# Patient Record
Sex: FEMALE | Race: WHITE | NOT HISPANIC OR LATINO | Employment: UNEMPLOYED | RURAL
[De-identification: names, ages, dates, MRNs, and addresses within clinical notes are randomized per-mention and may not be internally consistent; named-entity substitution may affect disease eponyms.]

---

## 2017-01-03 ENCOUNTER — OFFICE VISIT (OUTPATIENT)
Dept: GASTROENTEROLOGY | Facility: CLINIC | Age: 62
End: 2017-01-03

## 2017-01-03 VITALS
BODY MASS INDEX: 33.31 KG/M2 | DIASTOLIC BLOOD PRESSURE: 70 MMHG | HEART RATE: 95 BPM | WEIGHT: 188 LBS | HEIGHT: 63 IN | SYSTOLIC BLOOD PRESSURE: 142 MMHG

## 2017-01-03 DIAGNOSIS — R11.0 NAUSEA: ICD-10-CM

## 2017-01-03 DIAGNOSIS — R63.4 WEIGHT LOSS, ABNORMAL: ICD-10-CM

## 2017-01-03 DIAGNOSIS — Z87.19 HISTORY OF COLITIS: ICD-10-CM

## 2017-01-03 DIAGNOSIS — R13.10 DYSPHAGIA, UNSPECIFIED TYPE: ICD-10-CM

## 2017-01-03 DIAGNOSIS — K21.00 GASTROESOPHAGEAL REFLUX DISEASE WITH ESOPHAGITIS: Primary | ICD-10-CM

## 2017-01-03 DIAGNOSIS — R19.7 DIARRHEA, UNSPECIFIED TYPE: ICD-10-CM

## 2017-01-03 PROCEDURE — 99243 OFF/OP CNSLTJ NEW/EST LOW 30: CPT | Performed by: PHYSICIAN ASSISTANT

## 2017-01-03 RX ORDER — SODIUM, POTASSIUM,MAG SULFATES 17.5-3.13G
1 SOLUTION, RECONSTITUTED, ORAL ORAL ONCE
Qty: 1 BOTTLE | Refills: 0 | Status: SHIPPED | OUTPATIENT
Start: 2017-01-03 | End: 2017-01-03

## 2017-01-03 NOTE — PROGRESS NOTES
Chief Complaint   Patient presents with   • Difficulty Swallowing       Subjective    Mylene Cortez is a 61 y.o. female. she is being seen for consultation today at the request of Dr. Nunes.    History of Present Illness    This 61-year-old female lists no occupation was sent for consultation for abdominal pain and dysphagia Dr. Nunes who saw the patient on 9/15/16.  Patient states she does get frequent respiratory infections.  She has a long-standing history of polymyositis with dysphagia.  She denies having had any radiographic studies to evaluate this problem.  She mainly has dysphagia to solids, breads, starchy foods.  She has nausea at times but no vomiting.  In the past she's had severe heartburn and currently states her Protonix does fairly well.  She complains of 30 10 mid-upper abdominal pain.  She relates that she has had long-standing urgency to her bowel movements, frequently after she eats within minutes she will need to have a bowel movement.  She generally has 5-6 bowel movements per day.  No blood or mucus in her stools.  She states in the past 2 years because of stress and diet changes she has lost about 50 pounds.  Patient does see Dr. Johnson and is on CellCept.  Most recent available laboratories on 8/16/16 cholesterol 209.  Normal CBC, CMP, A1c, vitamin D was 22.8.    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  She states she's had previous colon polyps.  We were able to obtain EGD/colonoscopy done on 9/26/13 after the patient left showed a Schatzki's ring, hiatal hernia, nonerosive gastritis.  Colonoscopy showed a colitis in the descending colon with biopsy showing nonspecific changes.  Sigmoid diverticulosis, hemorrhoids, hyperplastic polyp removed from the sigmoid, 2 tubular adenomas removed from the transverse  colon.    Patient currently denies tobacco, alcohol, illicit substance use.  She has a history of pyomyositis, hysterectomy, cholecystectomy, knee surgeries, pneumonia.  Family history of colon cancer in an aunt, breast cancer, lung cancer, diabetes, heart disease, stroke, gallstones, hypertension, allergies.  Father  age 58 with pneumonia.  Mother  age 38 and a car accident.  Spouse age 56 in good health  since .  One brother was murdered.  6 sisters in average health, one sisters had lung cancer.  One brother was murdered.  2 children in good health.    A/P: Patient with nausea, increasing dysphagia, suspect peptic stricture, recommend EGD with probable dilatation.  Consider motility evaluation, possible barium study, chest x-ray.  Given her history of colon polyps and questionable history of colitis and diarrhea recommend colonoscopies with attempted look at the terminal ileum, as well as biopsies.  We'll see her in follow-up after the above, further pending clinical course and the results of the above.    Thank you very much Dr. Nunes for this consultation and for allowing us risk been care of your patient.  We'll keep you informed.     The following portions of the patient's history were reviewed and updated as appropriate:   Past Medical History   Diagnosis Date   • Allergic    • Anemia    • Arthritis    • Asthma    • Cholelithiasis    • Colon polyp    • Diverticulosis    • Dysphagia    • Encounter for screening for diabetes mellitus    • Encounter for screening for endocrine disorder      for other suspected endocrine disorder   • Essential hypertension      Primary   • Fatigue    • GERD (gastroesophageal reflux disease)    • H/O colonoscopy    • Hyperlipidemia    • Hypertension    • Low back pain    • Neuromuscular disorder    • Obesity    • Pneumonia    • Polymyositis      myalgia   • Polymyositis with myopathy    • Seizures    • Skin lesion    • Weakness      Past Surgical History    Procedure Laterality Date   • Colonoscopy  2014   • Total abdominal hysterectomy  2004   • Colonoscopy     • Fracture surgery     • Total abdominal hysterectomy with salpingo oophorectomy     • Knee surgery  2007   • Upper gastrointestinal endoscopy  09/26/2013     Schatzki's ring present in the GE junction.A hiatus hernia found in the cardia.Mild non-erosive gastritis found in the antrum.two biopsies taken.Normal duodenum. [facility]   • Gallbladder surgery  2008   • Pap smear  2013   • Hysterectomy       Family History   Problem Relation Age of Onset   • Breast cancer Sister    • Lung cancer Sister    • Alcohol abuse Other    • Diabetes Other    • Heart disease Other    • Hyperlipidemia Other    • Hypertension Other    • Osteoarthritis Other    • Colon cancer Other    • Other Mother 38     mva   • Alcohol abuse Other    • Diabetes Other    • Heart disease Other    • Hyperlipidemia Other    • Hypertension Other    • Osteoarthritis Other    • Colon cancer Other      colorectal cancer     OB History     No data available        Allergies   Allergen Reactions   • Bactrim [Sulfamethoxazole-Trimethoprim]    • Methotrexate Derivatives    • Morphine And Related Hives   • Singulair [Montelukast Sodium]    • Sulfa Antibiotics Irritability   • Tetanus Toxoids Other (See Comments)     *tetanus vaccines  *fainted     Social History     Social History   • Marital status:      Spouse name: N/A   • Number of children: N/A   • Years of education: N/A     Social History Main Topics   • Smoking status: Never Smoker   • Smokeless tobacco: Never Used   • Alcohol use No      Comment: socially   • Drug use: No   • Sexual activity: Not Asked     Other Topics Concern   • None     Social History Narrative    ** Merged History Encounter **            Current Outpatient Prescriptions:   •  acetaminophen-codeine (TYLENOL #3) 300-30 MG per tablet, Take 1 tablet by mouth Every 6 (Six) Hours As Needed for moderate  "pain (4-6)., Disp: 60 tablet, Rfl: 1  •  amLODIPine (NORVASC) 5 MG tablet, Take 5 mg by mouth daily., Disp: , Rfl:   •  cyclobenzaprine (FLEXERIL) 5 MG tablet, Take 1 tablet by mouth 3 (Three) Times a Day As Needed for muscle spasms., Disp: 90 tablet, Rfl: 11  •  Ergocalciferol (VITAMIN D2) 2000 UNITS tablet, Take 1 tablet by mouth daily., Disp: 90 tablet, Rfl: 3  •  esomeprazole (NEXIUM) 40 MG capsule, Take 1 capsule by mouth Every Morning Before Breakfast., Disp: 30 capsule, Rfl: 11  •  fexofenadine (ALLEGRA ALLERGY) 180 MG tablet, Take 1 tablet by mouth Daily., Disp: 30 tablet, Rfl: 11  •  fluticasone (FLONASE) 50 MCG/ACT nasal spray, 2 sprays into each nostril Daily for 30 days., Disp: 1 each, Rfl: 0  •  Melatonin 10 MG capsule, Take 1 capsule by mouth at night as needed., Disp: , Rfl:   •  meloxicam (MOBIC) 15 MG tablet, Take 15 mg by mouth every morning., Disp: , Rfl:   •  mycophenolate (CELLCEPT) 500 MG tablet, Take 1,000 mg by mouth daily., Disp: , Rfl:   •  traZODone (DESYREL) 50 MG tablet, Take 1 tablet by mouth Every Night., Disp: 30 tablet, Rfl: 11  •  Unable to find, 1 each daily. Med Name: * coQ10-ALA-resveratrol-leucovorin 1mg-B6-B12 2pd-V-M6-500 unit tablet, Disp: , Rfl:   Review of Systems  Review of Systems   Constitutional: Positive for unexpected weight change.   HENT: Positive for trouble swallowing. Negative for voice change.    Gastrointestinal: Positive for abdominal pain, diarrhea and nausea. Negative for abdominal distention, anal bleeding, blood in stool, constipation, rectal pain and vomiting.   Genitourinary: Negative for difficulty urinating.   All other systems reviewed and are negative.         Objective      Visit Vitals   • /70 (BP Location: Left arm)   • Pulse 95   • Ht 63\" (160 cm)   • Wt 188 lb (85.3 kg)   • BMI 33.3 kg/m2     Physical Exam   Constitutional: She is oriented to person, place, and time. She appears well-developed and well-nourished. No distress.   Chronically " ill appearing   HENT:   Head: Normocephalic and atraumatic.   Eyes: EOM are normal. Pupils are equal, round, and reactive to light.   Neck: Normal range of motion.   Cardiovascular: Normal rate, regular rhythm and normal heart sounds.    Pulmonary/Chest: Effort normal and breath sounds normal.   Abdominal: Soft. Bowel sounds are normal. She exhibits no shifting dullness, no distension, no abdominal bruit, no ascites and no mass. There is no hepatosplenomegaly. There is tenderness. There is no rigidity, no rebound, no guarding and no CVA tenderness. No hernia. Hernia confirmed negative in the ventral area.   Obese, scar, mild diffuse   Musculoskeletal: Normal range of motion.   Neurological: She is alert and oriented to person, place, and time.   Skin: Skin is warm and dry.   Psychiatric: She has a normal mood and affect. Her behavior is normal. Judgment and thought content normal.   Nursing note and vitals reviewed.    Hospital Outpatient Visit on 08/16/2016   Component Date Value Ref Range Status   • Total Cholesterol 08/16/2016 209* 0 - 199 mg/dl Final    CHOL AVERAGE RISK: 200 - 239 MG/DL   • Triglycerides 08/16/2016 193  20 - 199 mg/dl Final    TRIG DESIRED: < 200 MG/DL   • HDL Cholesterol 08/16/2016 52* 60 - 200 mg/dl Final    HDL AVERAGE RISK: 35 - 60 MG/DL   • LDL Cholesterol  08/16/2016 118  0 - 129 mg/dl Final    Comment: Calculated LDL results only valid if Triglycerides are < 400 mg/dL.  LDL DESIRED: < 130 MG/DL     • Sodium 08/16/2016 143  137 - 145 mmol/L Final   • Potassium 08/16/2016 4.8  3.5 - 5.1 mmol/L Final   • Chloride 08/16/2016 104  95 - 110 mmol/L Final   • CO2 08/16/2016 27  22 - 31 mmol/L Final   • Anion Gap 08/16/2016 12.0  5.0 - 15.0 mmol/L Final   • Glucose 08/16/2016 100  60 - 100 mg/dl Final   • BUN 08/16/2016 9  7 - 21 mg/dl Final   • Creatinine 08/16/2016 0.7  0.5 - 1.0 mg/dl Final   • GFR MDRD Non  08/16/2016 86  51 - 120 mL/min/1.73 sq.M Final    Comment: Invalid if  creatinine is changing or the patient is on dialysis. Use AA  result if patient is -American, non AA result otherwise.     • GFR MDRD  08/16/2016 104  51 - 120 mL/min/1.73 sq.M Final   • Calcium 08/16/2016 9.5  8.4 - 10.2 mg/dl Final   • Total Protein 08/16/2016 6.7  6.3 - 8.6 gm/dl Final   • Albumin 08/16/2016 3.9  3.4 - 4.8 gm/dl Final   • Total Bilirubin 08/16/2016 0.3  0.2 - 1.3 mg/dl Final   • Alkaline Phosphatase 08/16/2016 75  38 - 126 U/L Final   • AST (SGOT) 08/16/2016 28  14 - 36 U/L Final   • ALT (SGPT) 08/16/2016 29  9 - 52 U/L Final   • 25 Hydroxy, Vitamin D 08/16/2016 22.8* 30.0 - 100.0 ng/ml Final    Comment: INTERPRETIVE INFORMATION:  Deficient...................<20 ng/ml  Insufficient..........20-<30 ng/ml  Sufficient.............. ng/ml  Potiential Toxicity.....100 ng/ml       • Hemoglobin A1C 08/16/2016 5.4  4.0 - 5.6 %TotHgb Final   • WBC 08/16/2016 6.1  3.2 - 9.8 x1000/uL Final   • RBC 08/16/2016 4.99  3.77 - 5.16 sung/mm3 Final   • Hemoglobin 08/16/2016 13.8  12.0 - 15.5 gm/dl Final   • Hematocrit 08/16/2016 42.6  35.0 - 45.0 % Final   • MCV 08/16/2016 85.4  80.0 - 98.0 fl Final   • MCH 08/16/2016 27.7  26.0 - 34.0 pg Final   • MCHC 08/16/2016 32.4  31.4 - 36.0 gm/dl Final   • RDW 08/16/2016 13.7  11.5 - 14.5 % Final   • Platelets 08/16/2016 257  150 - 450 x1000/mm3 Final   • MPV 08/16/2016 11.1  8.0 - 12.0 fl Final   • Neutrophil Rel % 08/16/2016 48.8  37.0 - 80.0 % Final   • Lymphocyte Rel % 08/16/2016 44.6  10.0 - 50.0 % Final   • Monocyte Rel % 08/16/2016 4.0  0.0 - 12.0 % Final   • Eosinophil Rel % 08/16/2016 2.1  0.0 - 7.0 % Final   • Basophil Rel % 08/16/2016 0.3  0.0 - 2.0 % Final   • Immature Granulocyte Rel % 08/16/2016 0.20  0.00 - 0.50 % Final   • Neutrophils Absolute 08/16/2016 2.96  2.00 - 8.60 x1000/uL Final   • Lymphocytes Absolute 08/16/2016 2.71  0.60 - 4.20 x1000/uL Final   • Monocytes Absolute 08/16/2016 0.24  0.00 - 0.90 x1000/uL Final   •  Eosinophils Absolute 08/16/2016 0.13  0.00 - 0.70 x1000/uL Final   • Basophils Absolute 08/16/2016 0.02  0.00 - 0.20 x1000/uL Final   • Immature Granulocytes Absolute 08/16/2016 0.010  0.005 - 0.022 x1000/uL Final     Assessment/Plan      1. Gastroesophageal reflux disease with esophagitis    2. Nausea    3. Diarrhea, unspecified type    4. Weight loss, abnormal    5. Dysphagia, unspecified type    6. History of colitis    .   Mylene was seen today for difficulty swallowing.    Diagnoses and all orders for this visit:    Gastroesophageal reflux disease with esophagitis  -     Esophagoscopy w/ dilation    Nausea  -     Esophagoscopy w/ dilation    Diarrhea, unspecified type  -     Esophagoscopy w/ dilation  -     Colonoscopy w/ biopsies  -     sodium-potassium-magnesium sulfates (SUPREP BOWEL PREP) solution oral solution; Take 1 bottle by mouth 1 (One) Time for 1 dose. Take as per instruction sheet for colonoscopy prep.    Weight loss, abnormal  -     Esophagoscopy w/ dilation  -     Colonoscopy w/ biopsies    Dysphagia, unspecified type  -     Esophagoscopy w/ dilation    History of colitis  -     Colonoscopy w/ biopsies  -     sodium-potassium-magnesium sulfates (SUPREP BOWEL PREP) solution oral solution; Take 1 bottle by mouth 1 (One) Time for 1 dose. Take as per instruction sheet for colonoscopy prep.        Orders placed during this encounter include:  Orders Placed This Encounter   Procedures   • Esophagoscopy w/ dilation     Sedation: MAC  Endoscopy Protocol:  *If patient is diabetic, check FSBS.  *For females of child bearing age, 10-55 years old, who have not had a hysterectomy-Urine for HcG. If unable to obtain urine specimen, obtain serum for HcG.  *If applicable, access Mediport and flush per VAD flush protocol.  *For outpatients-D5 1/2 NS @KVO on arrival. If patient is diabetic with FSBS 200 or greater, give NS @KVO.  *For Inpatients that have IVFs with any additive, hang NS @@KVO prior to procedure and  discontinue prior to leaving Endoscopy.  *May use LIdocaine 0.5%, 0.5ml (if not allergic) x three, to infiltrate, 30 gauge needle for initiation of IV.    EGDw/Dil, Colon with bx, Victorina, routine   • Colonoscopy w/ biopsies     Sedation: MAC  Endoscopy Protocol:  *If patient is diabetic, check FSBS.  *For females of child bearing age, 10-55 years old, who have not had a hysterectomy-Urine for HcG. If unable to obtain urine specimen, obtain serum for HcG.  *If applicable, access Mediport and flush per VAD flush protocol.  *For outpatients-D5 1/2 NS @KVO on arrival. If patient is diabetic with FSBS 200 or greater, give NS @KVO.  *For Inpatients that have IVFs with any additive, hang NS @@KVO prior to procedure and discontinue prior to leaving Endoscopy.  *May use LIdocaine 0.5%, 0.5ml (if not allergic) x three, to infiltrate, 30 gauge needle for initiation of IV.    EGDw/Dil, Colon with bx, Victorina, routine       Medications prescribed:  New Medications Ordered This Visit   Medications   • sodium-potassium-magnesium sulfates (SUPREP BOWEL PREP) solution oral solution     Sig: Take 1 bottle by mouth 1 (One) Time for 1 dose. Take as per instruction sheet for colonoscopy prep.     Dispense:  1 bottle     Refill:  0     Discontinued Medications       Reason for Discontinue    montelukast (SINGULAIR) 5 MG chewable tablet Side effects    Diphenhydramine-APAP, sleep, (TYLENOL PM EXTRA STRENGTH PO) Therapy completed        Requested Prescriptions     Signed Prescriptions Disp Refills   • sodium-potassium-magnesium sulfates (SUPREP BOWEL PREP) solution oral solution 1 bottle 0     Sig: Take 1 bottle by mouth 1 (One) Time for 1 dose. Take as per instruction sheet for colonoscopy prep.       Review and/or summary of lab tests, radiology, procedures, medications. Review and summary of old records and obtaining of history. The risks and benefits of my recommendations, as well as other treatment options were discussed with the  patient today. Questions were answered.    Follow-up: Return if symptoms worsen or fail to improve, for After the above.        This document has been electronically signed by Og Haas PA-C on January 11, 2017 6:35 PM      Results for orders placed or performed during the hospital encounter of 08/16/16   Vitamin D 25 hydroxy   Result Value Ref Range    25 Hydroxy, Vitamin D 22.8 (L) 30.0 - 100.0 ng/ml   CBC and Differential   Result Value Ref Range    WBC 6.1 3.2 - 9.8 x1000/uL    RBC 4.99 3.77 - 5.16 sung/mm3    Hemoglobin 13.8 12.0 - 15.5 gm/dl    Hematocrit 42.6 35.0 - 45.0 %    MCV 85.4 80.0 - 98.0 fl    MCH 27.7 26.0 - 34.0 pg    MCHC 32.4 31.4 - 36.0 gm/dl    RDW 13.7 11.5 - 14.5 %    Platelets 257 150 - 450 x1000/mm3    MPV 11.1 8.0 - 12.0 fl    Neutrophil Rel % 48.8 37.0 - 80.0 %    Lymphocyte Rel % 44.6 10.0 - 50.0 %    Monocyte Rel % 4.0 0.0 - 12.0 %    Eosinophil Rel % 2.1 0.0 - 7.0 %    Basophil Rel % 0.3 0.0 - 2.0 %    Immature Granulocyte Rel % 0.20 0.00 - 0.50 %    Neutrophils Absolute 2.96 2.00 - 8.60 x1000/uL    Lymphocytes Absolute 2.71 0.60 - 4.20 x1000/uL    Monocytes Absolute 0.24 0.00 - 0.90 x1000/uL    Eosinophils Absolute 0.13 0.00 - 0.70 x1000/uL    Basophils Absolute 0.02 0.00 - 0.20 x1000/uL    Immature Granulocytes Absolute 0.010 0.005 - 0.022 x1000/uL   Hemoglobin A1c   Result Value Ref Range    Hemoglobin A1C 5.4 4.0 - 5.6 %TotHgb   Lipid panel   Result Value Ref Range    Total Cholesterol 209 (H) 0 - 199 mg/dl    Triglycerides 193 20 - 199 mg/dl    HDL Cholesterol 52 (L) 60 - 200 mg/dl    LDL Cholesterol  118 0 - 129 mg/dl   Comprehensive metabolic panel   Result Value Ref Range    Sodium 143 137 - 145 mmol/L    Potassium 4.8 3.5 - 5.1 mmol/L    Chloride 104 95 - 110 mmol/L    CO2 27 22 - 31 mmol/L    Anion Gap 12.0 5.0 - 15.0 mmol/L    Glucose 100 60 - 100 mg/dl    BUN 9 7 - 21 mg/dl    Creatinine 0.7 0.5 - 1.0 mg/dl    GFR MDRD Non  86 51 - 120 mL/min/1.73  sq.M    GFR MDRD  104 51 - 120 mL/min/1.73 sq.M    Calcium 9.5 8.4 - 10.2 mg/dl    Total Protein 6.7 6.3 - 8.6 gm/dl    Albumin 3.9 3.4 - 4.8 gm/dl    Total Bilirubin 0.3 0.2 - 1.3 mg/dl    Alkaline Phosphatase 75 38 - 126 U/L    AST (SGOT) 28 14 - 36 U/L    ALT (SGPT) 29 9 - 52 U/L   Results for orders placed or performed during the hospital encounter of 08/08/16   Urinalysis, Microscopic only   Result Value Ref Range    WBC, UA 3-5 NONE SEEN,0-2,3-5  /HPF    RBC, UA 3-5 NONE SEEN,0-2,3-5  /HPF    Epithelial cells 6-12 (H) NONE SEEN,0-2,3-5  /HPF    Bacteria, UA 1+ (H) NONE SEEN   Results for orders placed or performed in visit on 08/08/16   POCT urinalysis dipstick, manual   Result Value Ref Range    Color Teri Yellow, Straw, Dark Yellow, Teri    Clarity, UA Cloudy (A) Clear    Glucose, UA Negative Negative mg/dL    Bilirubin Small (1+) (A) Negative    Ketones, UA Negative Negative    Specific Gravity  1.025 1.005 - 1.030    Blood, UA Moderate (A) Negative    pH, Urine 5.0 5.0 - 8.0    Protein, POC Trace (A) Negative mg/dL    Urobilinogen, UA Normal Normal    Leukocytes Negative Negative    Nitrite, UA Negative Negative   Results for orders placed or performed during the hospital encounter of 02/05/16   Buprenorphine Screen Urine   Result Value Ref Range    Buprenorphine, Urine Negative Negative   Urinalysis w/Culture if Indicated   Result Value Ref Range    Color Yellow Yellow,Straw,Colorless    Appearance, UA Clear Clear    Glucose, UA Negative Negative mg/dL    Bilirubin, UA Negative Negative    Ketones, UA Negative Negative mg/dL    Specific Gravity, UA 1.005 1.005 - 1.030    Blood, UA Negative Negative    pH, UA 6.0 5.0 - 8.0    Protein, UA Negative Negative mg/dL    Urobilinogen, UA 0.2 0.2 - 1.0 E.U./dL    Nitrite, UA Negative Negative    Leukocytes, UA Negative Negative   Urine drug screen   Result Value Ref Range    Amphetamine, Urine Qual Negative Negative    Barbiturates Screen, Urine  Negative Negative    Benzodiazepine Screen, Urine Negative Negative    Cocaine Screen, Urine Negative Negative    Methadone Screen, Urine Negative Negative    Opiate Screen, Urine Negative Negative    THC Screen Interpretation Negative Negative    Phencyclidine (PCP), Urine Negative Negative    Propoxyphene Screen Negative Negative   Oxycodone, urine   Result Value Ref Range    Oxycodone Screen, Urine Negative Negative   CBC and Differential   Result Value Ref Range    WBC 7.7 4.5 - 12.5 K/Cumm    RBC 5.18 4.20 - 5.40 Million    Hemoglobin 13.9 12.0 - 16.0 g/dL    Hematocrit 43.4 37.0 - 47.0 %    MCV 83.8 80.0 - 94.0 fL    MCH 26.8 (L) 27.0 - 33.0 pg    MCHC 32.0 (L) 33.0 - 37.0 g/dL    Platelets 263 130 - 400 K/Cumm    RDW 14.1 11.5 - 14.5 %    MPV 10.7 (H) 6.0 - 10.0 fL    Neutrophil Rel % 60.5 30.0 - 70.0 %    Lymphocyte Rel % 33.0 21.0 - 51.0 %    Monocyte Rel % 4.0 0.0 - 10.0 %    Eosinophil Rel % 1.8 0.0 - 5.0 %    Basophil Rel % 0.4 0.0 - 2.0 %    Immature Granulocyte Rel % 0.30 0.00 - 0.43 %    Neutrophils Absolute 4.7 1.4 - 6.5 K/Cumm    Lymphocytes Absolute 2.6 1.0 - 3.0 K/Cumm    Monocytes Absolute 0.3 0.1 - 0.9 K/Cumm    Eosinophils Absolute 0.1 0.0 - 0.7 K/Cumm    Basophils Absolute 0.0 0.0 - 0.3 K/Cumm    Abs Imm Gran 0.020 0.000 - 0.031 K/Cumm     *Note: Due to a large number of results and/or encounters for the requested time period, some results have not been displayed. A complete set of results can be found in Results Review.

## 2017-01-03 NOTE — MR AVS SNAPSHOT
Mylene Cortez   1/3/2017 1:30 PM   Office Visit    Dept Phone:  [phone]   Encounter #:  [id number]    Provider:  Og Haas PA-C   Department:  [facility] GASTROENTEROLOGY                Your Full Care Plan              Today's Medication Changes          These changes are accurate as of: 1/3/17  1:58 PM.  If you have any questions, ask your nurse or doctor.               New Medication(s)Ordered:     sodium-potassium-magnesium sulfates solution oral solution   Commonly known as:  SUPREP BOWEL PREP   Take 1 bottle by mouth 1 (One) Time for 1 dose. Take as per instruction sheet for colonoscopy prep.   Started by:  Og Haas PA-C         Stop taking medication(s)listed here:     montelukast 5 MG chewable tablet   Commonly known as:  SINGULAIR   Stopped by:  Og Haas PA-C           TYLENOL PM EXTRA STRENGTH PO   Stopped by:  Og Haas PA-C                Where to Get Your Medications      These medications were sent to [place] [address] [address] [facility] [phone] [place] [phone] [place] [address] [address], [facility] [address]     Phone:  [phone]     sodium-potassium-magnesium sulfates solution oral solution                  Your Updated Medication List          This list is accurate as of: 1/3/17  1:58 PM.  Always use your most recent med list.                acetaminophen-codeine 300-30 MG per tablet   Commonly known as:  TYLENOL #3   Take 1 tablet by mouth Every 6 (Six) Hours As Needed for moderate pain (4-6).       amLODIPine 5 MG tablet   Commonly known as:  NORVASC       cyclobenzaprine 5 MG tablet   Commonly known as:  FLEXERIL   Take 1 tablet by mouth 3 (Three) Times a Day As Needed for muscle spasms.       esomeprazole 40 MG capsule   Commonly known as:  NEXIUM   Take 1 capsule by mouth Every Morning Before Breakfast.       fexofenadine 180 MG tablet   Commonly known as:   ALLEGRA ALLERGY   Take 1 tablet by mouth Daily.       fluticasone 50 MCG/ACT nasal spray   Commonly known as:  FLONASE   2 sprays into each nostril Daily for 30 days.       Melatonin 10 MG capsule       meloxicam 15 MG tablet   Commonly known as:  MOBIC       mycophenolate 500 MG tablet   Commonly known as:  CELLCEPT       sodium-potassium-magnesium sulfates solution oral solution   Commonly known as:  SUPREP BOWEL PREP   Take 1 bottle by mouth 1 (One) Time for 1 dose. Take as per instruction sheet for colonoscopy prep.       traZODone 50 MG tablet   Commonly known as:  DESYREL   Take 1 tablet by mouth Every Night.       Unable to find       Vitamin D2 2000 UNITS tablet   Take 1 tablet by mouth daily.               We Performed the Following     Colonoscopy w/ biopsies     Esophagoscopy w/ dilation       You Were Diagnosed With        Codes Comments    Gastroesophageal reflux disease with esophagitis    -  Primary ICD-10-CM: K21.0  ICD-9-CM: 530.11     Nausea     ICD-10-CM: R11.0  ICD-9-CM: 787.02     Diarrhea, unspecified type     ICD-10-CM: R19.7  ICD-9-CM: 787.91     Weight loss, abnormal     ICD-10-CM: R63.4  ICD-9-CM: 783.21     Dysphagia, unspecified type     ICD-10-CM: R13.10  ICD-9-CM: 787.20     History of colitis     ICD-10-CM: Z87.19  ICD-9-CM: V12.79       Instructions     None    Patient Instructions History      Upcoming Appointments     Visit Type Date Time Department    NEW PATIENT 1/3/2017  1:30 PM [facility] GASTROENTER [facility]    OFFICE VISIT 1/16/2017  1:30 PM [facility]    OFFICE VISIT 2/8/2017  1:30 PM [facility]  [facility]      [facility] Signup     [facility] [place] [employer] allows you to send messages to your doctor, view your test results, renew your prescriptions, schedule appointments, and more. To sign up, go to [employer] and click on the Sign Up Now link in the New User? box. Enter your [employer] Activation Code exactly as it appears below along with the last four digits of your  "Social Security Number and your Date of Birth () to complete the sign-up process. If you do not sign up before the expiration date, you must request a new code.    [employer] Activation Code: [email or web address]  Expires: 2017  4:35 AM    If you have questions, you can email [email or web address] or call [phone] to talk to our [employer] staff. Remember, [employer] is NOT to be used for urgent needs. For medical emergencies, dial 911.               Other Info from Your Visit           Your Appointments     2017  1:30 PM CST   Office Visit with Og Nunes MD   [facility] (--)    [address] [place] [address]  [facility] [address] [phone]           Arrive 15 minutes prior to appointment.            2017  1:30 PM CST   Office Visit with Og Haas PA-C   [facility] GASTROENTEROLOGY (--)    [address] [place] [address] Dr  [facility] [address] [place] [id number] [phone]           Arrive 15 minutes prior to appointment.              Allergies     Bactrim [Sulfamethoxazole-trimethoprim]      Methotrexate Derivatives      Morphine And Related  Hives    Singulair [Montelukast Sodium]      Sulfa Antibiotics  Irritability    Tetanus Toxoids  Other (See Comments)    *tetanus vaccines  *fainted      Reason for Visit     Difficulty Swallowing           Vital Signs     Blood Pressure Pulse Height Weight Body Mass Index Smoking Status    142/70 (BP Location: Left arm) 95 63\" (160 cm) 188 lb (85.3 kg) 33.3 kg/m2 Never Smoker      Problems and Diagnoses Noted     Difficulty swallowing    Inflammation of the esophagus    -  Primary    Nauseous        Diarrhea        Weight loss, abnormal        History of colitis            "

## 2017-01-03 NOTE — LETTER
January 11, 2017     Og Nunes MD  [address]  [facility] [address]    Patient: Mylene Cortez   YOB: 1955   Date of Visit: 1/3/2017       Dear Dr. Manju MD:    Thank you for referring Mylene Cortez to me for evaluation. Below is a copy of my consult note.    If you have questions, please do not hesitate to call me. I look forward to following Mylene along with you.         Sincerely,        Og Haas PA-C        CC: No Recipients    Chief Complaint   Patient presents with   • Difficulty Swallowing       Subjective    Mylene Cortez is a 61 y.o. female. she is being seen for consultation today at the request of Dr. Nunes.    History of Present Illness    This 61-year-old female lists no occupation was sent for consultation for abdominal pain and dysphagia Dr. Nunes who saw the patient on 9/15/16.  Patient states she does get frequent respiratory infections.  She has a long-standing history of polymyositis with dysphagia.  She denies having had any radiographic studies to evaluate this problem.  She mainly has dysphagia to solids, breads, starchy foods.  She has nausea at times but no vomiting.  In the past she's had severe heartburn and currently states her Protonix does fairly well.  She complains of 30 10 mid-upper abdominal pain.  She relates that she has had long-standing urgency to her bowel movements, frequently after she eats within minutes she will need to have a bowel movement.  She generally has 5-6 bowel movements per day.  No blood or mucus in her stools.  She states in the past 2 years because of stress and diet changes she has lost about 50 pounds.  Patient does see Dr. Johnson and is on CellCept.  Most recent available laboratories on 8/16/16 cholesterol 209.  Normal CBC, CMP, A1c, vitamin D was 22.8.    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  She states she's had previous colon polyps.  We were able to obtain EGD/colonoscopy done on 13 after the patient left showed a Schatzki's ring, hiatal hernia, nonerosive gastritis.  Colonoscopy showed a colitis in the descending colon with biopsy showing nonspecific changes.  Sigmoid diverticulosis, hemorrhoids, hyperplastic polyp removed from the sigmoid, 2 tubular adenomas removed from the transverse colon.    Patient currently denies tobacco, alcohol, illicit substance use.  She has a history of pyomyositis, hysterectomy, cholecystectomy, knee surgeries, pneumonia.  Family history of colon cancer in an aunt, breast cancer, lung cancer, diabetes, heart disease, stroke, gallstones, hypertension, allergies.  Father  age 58 with pneumonia.  Mother  age 38 and a car accident.  Spouse age 56 in good health  since .  One brother was murdered.  6 sisters in average health, one sisters had lung cancer.  One brother was murdered.  2 children in good health.    A/P: Patient with nausea, increasing dysphagia, suspect peptic stricture, recommend EGD with probable dilatation.  Consider motility evaluation, possible barium study, chest x-ray.  Given her history of colon polyps and questionable history of colitis and diarrhea recommend colonoscopies with attempted look at the terminal ileum, as well as biopsies.  We'll see her in follow-up after the above, further pending clinical course and the results of the above.    Thank you very much Dr. Nunes for this consultation and for allowing us risk been care of your patient.  We'll keep you informed.     The following portions of the patient's history were reviewed and updated as appropriate:   Past Medical History   Diagnosis Date   • Allergic    • Anemia    • Arthritis    • Asthma    • Cholelithiasis    • Colon polyp    • Diverticulosis    • Dysphagia    • Encounter for screening  for diabetes mellitus    • Encounter for screening for endocrine disorder      for other suspected endocrine disorder   • Essential hypertension      Primary   • Fatigue    • GERD (gastroesophageal reflux disease)    • H/O colonoscopy 2014   • Hyperlipidemia    • Hypertension    • Low back pain    • Neuromuscular disorder    • Obesity    • Pneumonia    • Polymyositis      myalgia   • Polymyositis with myopathy    • Seizures    • Skin lesion    • Weakness      Past Surgical History   Procedure Laterality Date   • Colonoscopy  2014   • Total abdominal hysterectomy  2004   • Colonoscopy     • Fracture surgery     • Total abdominal hysterectomy with salpingo oophorectomy     • Knee surgery  2007   • Upper gastrointestinal endoscopy  09/26/2013     Schatzki's ring present in the GE junction.A hiatus hernia found in the cardia.Mild non-erosive gastritis found in the antrum.two biopsies taken.Normal duodenum. [facility]   • Gallbladder surgery  2008   • Pap smear  2013   • Hysterectomy       Family History   Problem Relation Age of Onset   • Breast cancer Sister    • Lung cancer Sister    • Alcohol abuse Other    • Diabetes Other    • Heart disease Other    • Hyperlipidemia Other    • Hypertension Other    • Osteoarthritis Other    • Colon cancer Other    • Other Mother 38     mva   • Alcohol abuse Other    • Diabetes Other    • Heart disease Other    • Hyperlipidemia Other    • Hypertension Other    • Osteoarthritis Other    • Colon cancer Other      colorectal cancer     OB History     No data available        Allergies   Allergen Reactions   • Bactrim [Sulfamethoxazole-Trimethoprim]    • Methotrexate Derivatives    • Morphine And Related Hives   • Singulair [Montelukast Sodium]    • Sulfa Antibiotics Irritability   • Tetanus Toxoids Other (See Comments)     *tetanus vaccines  *fainted     Social History     Social History   • Marital status:      Spouse name: N/A   • Number of children: N/A    • Years of education: N/A     Social History Main Topics   • Smoking status: Never Smoker   • Smokeless tobacco: Never Used   • Alcohol use No      Comment: socially   • Drug use: No   • Sexual activity: Not Asked     Other Topics Concern   • None     Social History Narrative    ** Merged History Encounter **            Current Outpatient Prescriptions:   •  acetaminophen-codeine (TYLENOL #3) 300-30 MG per tablet, Take 1 tablet by mouth Every 6 (Six) Hours As Needed for moderate pain (4-6)., Disp: 60 tablet, Rfl: 1  •  amLODIPine (NORVASC) 5 MG tablet, Take 5 mg by mouth daily., Disp: , Rfl:   •  cyclobenzaprine (FLEXERIL) 5 MG tablet, Take 1 tablet by mouth 3 (Three) Times a Day As Needed for muscle spasms., Disp: 90 tablet, Rfl: 11  •  Ergocalciferol (VITAMIN D2) 2000 UNITS tablet, Take 1 tablet by mouth daily., Disp: 90 tablet, Rfl: 3  •  esomeprazole (NEXIUM) 40 MG capsule, Take 1 capsule by mouth Every Morning Before Breakfast., Disp: 30 capsule, Rfl: 11  •  fexofenadine (ALLEGRA ALLERGY) 180 MG tablet, Take 1 tablet by mouth Daily., Disp: 30 tablet, Rfl: 11  •  fluticasone (FLONASE) 50 MCG/ACT nasal spray, 2 sprays into each nostril Daily for 30 days., Disp: 1 each, Rfl: 0  •  Melatonin 10 MG capsule, Take 1 capsule by mouth at night as needed., Disp: , Rfl:   •  meloxicam (MOBIC) 15 MG tablet, Take 15 mg by mouth every morning., Disp: , Rfl:   •  mycophenolate (CELLCEPT) 500 MG tablet, Take 1,000 mg by mouth daily., Disp: , Rfl:   •  traZODone (DESYREL) 50 MG tablet, Take 1 tablet by mouth Every Night., Disp: 30 tablet, Rfl: 11  •  Unable to find, 1 each daily. Med Name: * coQ10-ALA-resveratrol-leucovorin 1mg-B6-B12 2vw-K-M9-500 unit tablet, Disp: , Rfl:   Review of Systems  Review of Systems   Constitutional: Positive for unexpected weight change.   HENT: Positive for trouble swallowing. Negative for voice change.    Gastrointestinal: Positive for abdominal pain, diarrhea and nausea. Negative for abdominal  "distention, anal bleeding, blood in stool, constipation, rectal pain and vomiting.   Genitourinary: Negative for difficulty urinating.   All other systems reviewed and are negative.         Objective      Visit Vitals   • /70 (BP Location: Left arm)   • Pulse 95   • Ht 63\" (160 cm)   • Wt 188 lb (85.3 kg)   • BMI 33.3 kg/m2     Physical Exam   Constitutional: She is oriented to person, place, and time. She appears well-developed and well-nourished. No distress.   Chronically ill appearing   HENT:   Head: Normocephalic and atraumatic.   Eyes: EOM are normal. Pupils are equal, round, and reactive to light.   Neck: Normal range of motion.   Cardiovascular: Normal rate, regular rhythm and normal heart sounds.    Pulmonary/Chest: Effort normal and breath sounds normal.   Abdominal: Soft. Bowel sounds are normal. She exhibits no shifting dullness, no distension, no abdominal bruit, no ascites and no mass. There is no hepatosplenomegaly. There is tenderness. There is no rigidity, no rebound, no guarding and no CVA tenderness. No hernia. Hernia confirmed negative in the ventral area.   Obese, scar, mild diffuse   Musculoskeletal: Normal range of motion.   Neurological: She is alert and oriented to person, place, and time.   Skin: Skin is warm and dry.   Psychiatric: She has a normal mood and affect. Her behavior is normal. Judgment and thought content normal.   Nursing note and vitals reviewed.    Hospital Outpatient Visit on 08/16/2016   Component Date Value Ref Range Status   • Total Cholesterol 08/16/2016 209* 0 - 199 mg/dl Final    CHOL AVERAGE RISK: 200 - 239 MG/DL   • Triglycerides 08/16/2016 193  20 - 199 mg/dl Final    TRIG DESIRED: < 200 MG/DL   • HDL Cholesterol 08/16/2016 52* 60 - 200 mg/dl Final    HDL AVERAGE RISK: 35 - 60 MG/DL   • LDL Cholesterol  08/16/2016 118  0 - 129 mg/dl Final    Comment: Calculated LDL results only valid if Triglycerides are < 400 mg/dL.  LDL DESIRED: < 130 MG/DL     • Sodium " 08/16/2016 143  137 - 145 mmol/L Final   • Potassium 08/16/2016 4.8  3.5 - 5.1 mmol/L Final   • Chloride 08/16/2016 104  95 - 110 mmol/L Final   • CO2 08/16/2016 27  22 - 31 mmol/L Final   • Anion Gap 08/16/2016 12.0  5.0 - 15.0 mmol/L Final   • Glucose 08/16/2016 100  60 - 100 mg/dl Final   • BUN 08/16/2016 9  7 - 21 mg/dl Final   • Creatinine 08/16/2016 0.7  0.5 - 1.0 mg/dl Final   • GFR MDRD Non  08/16/2016 86  51 - 120 mL/min/1.73 sq.M Final    Comment: Invalid if creatinine is changing or the patient is on dialysis. Use AA  result if patient is -American, non AA result otherwise.     • GFR MDRD  08/16/2016 104  51 - 120 mL/min/1.73 sq.M Final   • Calcium 08/16/2016 9.5  8.4 - 10.2 mg/dl Final   • Total Protein 08/16/2016 6.7  6.3 - 8.6 gm/dl Final   • Albumin 08/16/2016 3.9  3.4 - 4.8 gm/dl Final   • Total Bilirubin 08/16/2016 0.3  0.2 - 1.3 mg/dl Final   • Alkaline Phosphatase 08/16/2016 75  38 - 126 U/L Final   • AST (SGOT) 08/16/2016 28  14 - 36 U/L Final   • ALT (SGPT) 08/16/2016 29  9 - 52 U/L Final   • 25 Hydroxy, Vitamin D 08/16/2016 22.8* 30.0 - 100.0 ng/ml Final    Comment: INTERPRETIVE INFORMATION:  Deficient...................<20 ng/ml  Insufficient..........20-<30 ng/ml  Sufficient.............. ng/ml  Potiential Toxicity.....100 ng/ml       • Hemoglobin A1C 08/16/2016 5.4  4.0 - 5.6 %TotHgb Final   • WBC 08/16/2016 6.1  3.2 - 9.8 x1000/uL Final   • RBC 08/16/2016 4.99  3.77 - 5.16 sung/mm3 Final   • Hemoglobin 08/16/2016 13.8  12.0 - 15.5 gm/dl Final   • Hematocrit 08/16/2016 42.6  35.0 - 45.0 % Final   • MCV 08/16/2016 85.4  80.0 - 98.0 fl Final   • MCH 08/16/2016 27.7  26.0 - 34.0 pg Final   • MCHC 08/16/2016 32.4  31.4 - 36.0 gm/dl Final   • RDW 08/16/2016 13.7  11.5 - 14.5 % Final   • Platelets 08/16/2016 257  150 - 450 x1000/mm3 Final   • MPV 08/16/2016 11.1  8.0 - 12.0 fl Final   • Neutrophil Rel % 08/16/2016 48.8  37.0 - 80.0 % Final   •  Lymphocyte Rel % 08/16/2016 44.6  10.0 - 50.0 % Final   • Monocyte Rel % 08/16/2016 4.0  0.0 - 12.0 % Final   • Eosinophil Rel % 08/16/2016 2.1  0.0 - 7.0 % Final   • Basophil Rel % 08/16/2016 0.3  0.0 - 2.0 % Final   • Immature Granulocyte Rel % 08/16/2016 0.20  0.00 - 0.50 % Final   • Neutrophils Absolute 08/16/2016 2.96  2.00 - 8.60 x1000/uL Final   • Lymphocytes Absolute 08/16/2016 2.71  0.60 - 4.20 x1000/uL Final   • Monocytes Absolute 08/16/2016 0.24  0.00 - 0.90 x1000/uL Final   • Eosinophils Absolute 08/16/2016 0.13  0.00 - 0.70 x1000/uL Final   • Basophils Absolute 08/16/2016 0.02  0.00 - 0.20 x1000/uL Final   • Immature Granulocytes Absolute 08/16/2016 0.010  0.005 - 0.022 x1000/uL Final     Assessment/Plan      1. Gastroesophageal reflux disease with esophagitis    2. Nausea    3. Diarrhea, unspecified type    4. Weight loss, abnormal    5. Dysphagia, unspecified type    6. History of colitis    .   Mylene was seen today for difficulty swallowing.    Diagnoses and all orders for this visit:    Gastroesophageal reflux disease with esophagitis  -     Esophagoscopy w/ dilation    Nausea  -     Esophagoscopy w/ dilation    Diarrhea, unspecified type  -     Esophagoscopy w/ dilation  -     Colonoscopy w/ biopsies  -     sodium-potassium-magnesium sulfates (SUPREP BOWEL PREP) solution oral solution; Take 1 bottle by mouth 1 (One) Time for 1 dose. Take as per instruction sheet for colonoscopy prep.    Weight loss, abnormal  -     Esophagoscopy w/ dilation  -     Colonoscopy w/ biopsies    Dysphagia, unspecified type  -     Esophagoscopy w/ dilation    History of colitis  -     Colonoscopy w/ biopsies  -     sodium-potassium-magnesium sulfates (SUPREP BOWEL PREP) solution oral solution; Take 1 bottle by mouth 1 (One) Time for 1 dose. Take as per instruction sheet for colonoscopy prep.        Orders placed during this encounter include:  Orders Placed This Encounter   Procedures   • Esophagoscopy w/ dilation      Sedation: MAC  Endoscopy Protocol:  *If patient is diabetic, check FSBS.  *For females of child bearing age, 10-55 years old, who have not had a hysterectomy-Urine for HcG. If unable to obtain urine specimen, obtain serum for HcG.  *If applicable, access Mediport and flush per VAD flush protocol.  *For outpatients-D5 1/2 NS @KVO on arrival. If patient is diabetic with FSBS 200 or greater, give NS @KVO.  *For Inpatients that have IVFs with any additive, hang NS @@KVO prior to procedure and discontinue prior to leaving Endoscopy.  *May use LIdocaine 0.5%, 0.5ml (if not allergic) x three, to infiltrate, 30 gauge needle for initiation of IV.    EGDw/Dil, Colon with bx, Victorina, routine   • Colonoscopy w/ biopsies     Sedation: MAC  Endoscopy Protocol:  *If patient is diabetic, check FSBS.  *For females of child bearing age, 10-55 years old, who have not had a hysterectomy-Urine for HcG. If unable to obtain urine specimen, obtain serum for HcG.  *If applicable, access Mediport and flush per VAD flush protocol.  *For outpatients-D5 1/2 NS @KVO on arrival. If patient is diabetic with FSBS 200 or greater, give NS @KVO.  *For Inpatients that have IVFs with any additive, hang NS @@KVO prior to procedure and discontinue prior to leaving Endoscopy.  *May use LIdocaine 0.5%, 0.5ml (if not allergic) x three, to infiltrate, 30 gauge needle for initiation of IV.    EGDw/Dil, Colon with bx, Victorina, routine       Medications prescribed:  New Medications Ordered This Visit   Medications   • sodium-potassium-magnesium sulfates (SUPREP BOWEL PREP) solution oral solution     Sig: Take 1 bottle by mouth 1 (One) Time for 1 dose. Take as per instruction sheet for colonoscopy prep.     Dispense:  1 bottle     Refill:  0     Discontinued Medications       Reason for Discontinue    montelukast (SINGULAIR) 5 MG chewable tablet Side effects    Diphenhydramine-APAP, sleep, (TYLENOL PM EXTRA STRENGTH PO) Therapy completed        Requested  Prescriptions     Signed Prescriptions Disp Refills   • sodium-potassium-magnesium sulfates (SUPREP BOWEL PREP) solution oral solution 1 bottle 0     Sig: Take 1 bottle by mouth 1 (One) Time for 1 dose. Take as per instruction sheet for colonoscopy prep.       Review and/or summary of lab tests, radiology, procedures, medications. Review and summary of old records and obtaining of history. The risks and benefits of my recommendations, as well as other treatment options were discussed with the patient today. Questions were answered.    Follow-up: Return if symptoms worsen or fail to improve, for After the above.        This document has been electronically signed by Og Haas PA-C on January 11, 2017 6:35 PM      Results for orders placed or performed during the hospital encounter of 08/16/16   Vitamin D 25 hydroxy   Result Value Ref Range    25 Hydroxy, Vitamin D 22.8 (L) 30.0 - 100.0 ng/ml   CBC and Differential   Result Value Ref Range    WBC 6.1 3.2 - 9.8 x1000/uL    RBC 4.99 3.77 - 5.16 sung/mm3    Hemoglobin 13.8 12.0 - 15.5 gm/dl    Hematocrit 42.6 35.0 - 45.0 %    MCV 85.4 80.0 - 98.0 fl    MCH 27.7 26.0 - 34.0 pg    MCHC 32.4 31.4 - 36.0 gm/dl    RDW 13.7 11.5 - 14.5 %    Platelets 257 150 - 450 x1000/mm3    MPV 11.1 8.0 - 12.0 fl    Neutrophil Rel % 48.8 37.0 - 80.0 %    Lymphocyte Rel % 44.6 10.0 - 50.0 %    Monocyte Rel % 4.0 0.0 - 12.0 %    Eosinophil Rel % 2.1 0.0 - 7.0 %    Basophil Rel % 0.3 0.0 - 2.0 %    Immature Granulocyte Rel % 0.20 0.00 - 0.50 %    Neutrophils Absolute 2.96 2.00 - 8.60 x1000/uL    Lymphocytes Absolute 2.71 0.60 - 4.20 x1000/uL    Monocytes Absolute 0.24 0.00 - 0.90 x1000/uL    Eosinophils Absolute 0.13 0.00 - 0.70 x1000/uL    Basophils Absolute 0.02 0.00 - 0.20 x1000/uL    Immature Granulocytes Absolute 0.010 0.005 - 0.022 x1000/uL   Hemoglobin A1c   Result Value Ref Range    Hemoglobin A1C 5.4 4.0 - 5.6 %TotHgb   Lipid panel   Result Value Ref Range    Total Cholesterol  209 (H) 0 - 199 mg/dl    Triglycerides 193 20 - 199 mg/dl    HDL Cholesterol 52 (L) 60 - 200 mg/dl    LDL Cholesterol  118 0 - 129 mg/dl   Comprehensive metabolic panel   Result Value Ref Range    Sodium 143 137 - 145 mmol/L    Potassium 4.8 3.5 - 5.1 mmol/L    Chloride 104 95 - 110 mmol/L    CO2 27 22 - 31 mmol/L    Anion Gap 12.0 5.0 - 15.0 mmol/L    Glucose 100 60 - 100 mg/dl    BUN 9 7 - 21 mg/dl    Creatinine 0.7 0.5 - 1.0 mg/dl    GFR MDRD Non  86 51 - 120 mL/min/1.73 sq.M    GFR MDRD  104 51 - 120 mL/min/1.73 sq.M    Calcium 9.5 8.4 - 10.2 mg/dl    Total Protein 6.7 6.3 - 8.6 gm/dl    Albumin 3.9 3.4 - 4.8 gm/dl    Total Bilirubin 0.3 0.2 - 1.3 mg/dl    Alkaline Phosphatase 75 38 - 126 U/L    AST (SGOT) 28 14 - 36 U/L    ALT (SGPT) 29 9 - 52 U/L   Results for orders placed or performed during the hospital encounter of 08/08/16   Urinalysis, Microscopic only   Result Value Ref Range    WBC, UA 3-5 NONE SEEN,0-2,3-5  /HPF    RBC, UA 3-5 NONE SEEN,0-2,3-5  /HPF    Epithelial cells 6-12 (H) NONE SEEN,0-2,3-5  /HPF    Bacteria, UA 1+ (H) NONE SEEN   Results for orders placed or performed in visit on 08/08/16   POCT urinalysis dipstick, manual   Result Value Ref Range    Color Teri Yellow, Straw, Dark Yellow, Teri    Clarity, UA Cloudy (A) Clear    Glucose, UA Negative Negative mg/dL    Bilirubin Small (1+) (A) Negative    Ketones, UA Negative Negative    Specific Gravity  1.025 1.005 - 1.030    Blood, UA Moderate (A) Negative    pH, Urine 5.0 5.0 - 8.0    Protein, POC Trace (A) Negative mg/dL    Urobilinogen, UA Normal Normal    Leukocytes Negative Negative    Nitrite, UA Negative Negative   Results for orders placed or performed during the hospital encounter of 02/05/16   Buprenorphine Screen Urine   Result Value Ref Range    Buprenorphine, Urine Negative Negative   Urinalysis w/Culture if Indicated   Result Value Ref Range    Color Yellow Yellow,Straw,Colorless    Appearance, UA  Clear Clear    Glucose, UA Negative Negative mg/dL    Bilirubin, UA Negative Negative    Ketones, UA Negative Negative mg/dL    Specific Gravity, UA 1.005 1.005 - 1.030    Blood, UA Negative Negative    pH, UA 6.0 5.0 - 8.0    Protein, UA Negative Negative mg/dL    Urobilinogen, UA 0.2 0.2 - 1.0 E.U./dL    Nitrite, UA Negative Negative    Leukocytes, UA Negative Negative   Urine drug screen   Result Value Ref Range    Amphetamine, Urine Qual Negative Negative    Barbiturates Screen, Urine Negative Negative    Benzodiazepine Screen, Urine Negative Negative    Cocaine Screen, Urine Negative Negative    Methadone Screen, Urine Negative Negative    Opiate Screen, Urine Negative Negative    THC Screen Interpretation Negative Negative    Phencyclidine (PCP), Urine Negative Negative    Propoxyphene Screen Negative Negative   Oxycodone, urine   Result Value Ref Range    Oxycodone Screen, Urine Negative Negative   CBC and Differential   Result Value Ref Range    WBC 7.7 4.5 - 12.5 K/Cumm    RBC 5.18 4.20 - 5.40 Million    Hemoglobin 13.9 12.0 - 16.0 g/dL    Hematocrit 43.4 37.0 - 47.0 %    MCV 83.8 80.0 - 94.0 fL    MCH 26.8 (L) 27.0 - 33.0 pg    MCHC 32.0 (L) 33.0 - 37.0 g/dL    Platelets 263 130 - 400 K/Cumm    RDW 14.1 11.5 - 14.5 %    MPV 10.7 (H) 6.0 - 10.0 fL    Neutrophil Rel % 60.5 30.0 - 70.0 %    Lymphocyte Rel % 33.0 21.0 - 51.0 %    Monocyte Rel % 4.0 0.0 - 10.0 %    Eosinophil Rel % 1.8 0.0 - 5.0 %    Basophil Rel % 0.4 0.0 - 2.0 %    Immature Granulocyte Rel % 0.30 0.00 - 0.43 %    Neutrophils Absolute 4.7 1.4 - 6.5 K/Cumm    Lymphocytes Absolute 2.6 1.0 - 3.0 K/Cumm    Monocytes Absolute 0.3 0.1 - 0.9 K/Cumm    Eosinophils Absolute 0.1 0.0 - 0.7 K/Cumm    Basophils Absolute 0.0 0.0 - 0.3 K/Cumm    Abs Imm Gran 0.020 0.000 - 0.031 K/Cumm     *Note: Due to a large number of results and/or encounters for the requested time period, some results have not been displayed. A complete set of results can be found in  Results Review.

## 2017-01-16 ENCOUNTER — OFFICE VISIT (OUTPATIENT)
Dept: FAMILY MEDICINE CLINIC | Facility: CLINIC | Age: 62
End: 2017-01-16

## 2017-01-16 VITALS
OXYGEN SATURATION: 96 % | WEIGHT: 188.25 LBS | SYSTOLIC BLOOD PRESSURE: 138 MMHG | RESPIRATION RATE: 18 BRPM | TEMPERATURE: 98.5 F | HEART RATE: 77 BPM | HEIGHT: 63 IN | DIASTOLIC BLOOD PRESSURE: 68 MMHG | BODY MASS INDEX: 33.36 KG/M2

## 2017-01-16 DIAGNOSIS — G47.9 SLEEP DISORDER: ICD-10-CM

## 2017-01-16 DIAGNOSIS — M79.10 MYALGIA: ICD-10-CM

## 2017-01-16 DIAGNOSIS — Z91.81 HISTORY OF FALL: Primary | ICD-10-CM

## 2017-01-16 DIAGNOSIS — M33.20 POLYMYOSITIS (HCC): ICD-10-CM

## 2017-01-16 DIAGNOSIS — Z91.81 RISK FOR FALLS: ICD-10-CM

## 2017-01-16 DIAGNOSIS — M62.81 MUSCLE WEAKNESS (GENERALIZED): ICD-10-CM

## 2017-01-16 PROCEDURE — 99213 OFFICE O/P EST LOW 20 MIN: CPT | Performed by: FAMILY MEDICINE

## 2017-01-16 NOTE — PROGRESS NOTES
"Subjective   Mylene Cortez is a 61 y.o. female.     History of Present Illness     Problem List  1. Polymyositis  2. Muscle weakness  3. Hypertension  4. GERD/esophagitis  5. Obesity  6. History / Risk for Falls   7. Dysphagia/ H/O of esophageal fistula  8. Ulcerative Colitis?  9. Allergic Rhinitis   10. Degenerative changes of both hips     Patient is 62 yo WF with the above medical issues is here for recheck. Has polymyositis and seeing Dr. Johnson for her issues and takign Cellcept.  States pain in hands and muscles improved with Tylenol #3 with codeine.  Takes 1 pill at bedtime. Still has 90 pills left.   Is still awaiting for power wheelchair.  Regarding sleep Trazodone does help but has not tried melatonin with it.  Is about to move to [place] soon but would like to still come here for check ups.  Still gets around with mobility with cane.  Awaiting insurance for power wheel chair. Had recent x-rays of both hips that show mild degenerative changes.  Recent mammogram was normal      The following portions of the patient's history were reviewed and updated as appropriate: allergies, current medications, past family history, past medical history, past social history, past surgical history and problem list.    Review of Systems   Constitutional: Negative.    HENT: Negative.    Eyes: Negative.    Respiratory: Negative.    Cardiovascular: Negative.    Gastrointestinal: Negative.    Endocrine: Negative.    Genitourinary: Negative.    Musculoskeletal: Positive for arthralgias, back pain and gait problem.   Skin: Negative.    Allergic/Immunologic: Negative.    Hematological: Negative.    Psychiatric/Behavioral: Positive for sleep disturbance.       Objective    Visit Vitals   • /68   • Pulse 77   • Temp 98.5 °F (36.9 °C)   • Resp 18   • Ht 63\" (160 cm)   • Wt 188 lb 4 oz (85.4 kg)   • SpO2 96%   • BMI 33.35 kg/m2         Chemistry        Component Value Date/Time     08/16/2016 0933    K 4.8 " 08/16/2016 0933     08/16/2016 0933    CO2 27 08/16/2016 0933    BUN 9 08/16/2016 0933    CREATININE 0.7 08/16/2016 0933        Component Value Date/Time    CALCIUM 9.5 08/16/2016 0933    ALKPHOS 75 08/16/2016 0933    AST 28 08/16/2016 0933    ALT 29 08/16/2016 0933    BILITOT 0.3 08/16/2016 0933        Lab Results   Component Value Date    WBC 6.1 08/16/2016    HGB 13.8 08/16/2016    HCT 42.6 08/16/2016    MCV 85.4 08/16/2016     08/16/2016     No results found for: CHOL  Lab Results   Component Value Date    TRIG 193 08/16/2016     Lab Results   Component Value Date    HDL 52 (L) 08/16/2016     Lab Results   Component Value Date    LDLCALC 118 08/16/2016     No results found for: LDL  No results found for: HDLLDLRATIO  No components found for: CHOLHDL  Lab Results   Component Value Date    HGBA1C 5.4 08/16/2016     No results found for: TSH, E6DRABI, O6ZFOTK, THYROIDAB  Physical Exam   Constitutional: She is oriented to person, place, and time. She appears well-developed and well-nourished. No distress.   HENT:   Head: Normocephalic and atraumatic.   Right Ear: External ear normal.   Left Ear: External ear normal.   Nose: Nose normal.   Mouth/Throat: Oropharynx is clear and moist.   Eyes: Conjunctivae and EOM are normal. Pupils are equal, round, and reactive to light. Right eye exhibits no discharge. Left eye exhibits no discharge. No scleral icterus.   Neck: Normal range of motion. Neck supple. No JVD present. No tracheal deviation present. No thyromegaly present.   Cardiovascular: Normal rate, regular rhythm and normal heart sounds.    Pulmonary/Chest: Effort normal and breath sounds normal. No stridor. No respiratory distress. She has no wheezes.   Abdominal: Soft. Bowel sounds are normal. She exhibits no distension and no mass. There is no tenderness. There is no rebound and no guarding. No hernia.   Musculoskeletal: Normal range of motion. She exhibits no edema, tenderness or deformity.   Motor  strength 4/5 in upper and lower extremities    Lymphadenopathy:     She has no cervical adenopathy.   Neurological: She is alert and oriented to person, place, and time. She has normal reflexes. No cranial nerve deficit. Coordination normal.   Skin: Skin is warm and dry. No rash noted. She is not diaphoretic. No erythema. No pallor.   Psychiatric: She has a normal mood and affect. Her behavior is normal. Judgment and thought content normal.   Nursing note and vitals reviewed.      Assessment/Plan   Problems Addressed this Visit        Nervous and Auditory    Myalgia       Musculoskeletal and Integument    Polymyositis    Muscle weakness (generalized)       Other    Risk for falls    History of fall - Primary    Sleep disorder        For polymyositis - continue with Tylenol #3 with codeine.  Pt still has enough for a copule of months  - awaiting for power chair  -  Pt wants to continue seeing me in clinic. If pt decides to set up with new PCP near [place] will give records  - appt with Dr. Elizabeth swanson  - recheck in 3 months

## 2017-01-16 NOTE — MR AVS SNAPSHOT
Mylene Cortez   1/16/2017 1:30 PM   Office Visit    Dept Phone:  [phone]   Encounter #:  [id number]    Provider:  Og Nunes MD   Department:  [facility]                Your Full Care Plan              Your Updated Medication List          This list is accurate as of: 1/16/17  1:29 PM.  Always use your most recent med list.                acetaminophen-codeine 300-30 MG per tablet   Commonly known as:  TYLENOL #3   Take 1 tablet by mouth Every 6 (Six) Hours As Needed for moderate pain (4-6).       amLODIPine 5 MG tablet   Commonly known as:  NORVASC       cyclobenzaprine 5 MG tablet   Commonly known as:  FLEXERIL   Take 1 tablet by mouth 3 (Three) Times a Day As Needed for muscle spasms.       esomeprazole 40 MG capsule   Commonly known as:  NEXIUM   Take 1 capsule by mouth Every Morning Before Breakfast.       fexofenadine 180 MG tablet   Commonly known as:  ALLEGRA ALLERGY   Take 1 tablet by mouth Daily.       Melatonin 10 MG capsule       meloxicam 15 MG tablet   Commonly known as:  MOBIC       mycophenolate 500 MG tablet   Commonly known as:  CELLCEPT       traZODone 50 MG tablet   Commonly known as:  DESYREL   Take 1 tablet by mouth Every Night.       Unable to find       Vitamin D2 2000 UNITS tablet   Take 1 tablet by mouth daily.               Instructions     None    Patient Instructions History      Upcoming Appointments     Visit Type Date Time Department    OFFICE VISIT 1/16/2017  1:30 PM [facility]    OFFICE VISIT 2/8/2017  1:30 PM [facility]  [facility]    OFFICE VISIT 4/17/2017  1:15 PM [facility]      MyChart Signup     Our records indicate that you have declined [facility] [employer] signup. If you would like to sign up for [employer], please email [email or web address] or call [phone] to obtain an activation code.             Other Info from Your Visit           Your Appointments     Abundio  "16, 2017  1:30 PM CST   Office Visit with Og Nunes MD   [facility] (--)    [address] [place] [address]  [facility] [address] [phone]           Arrive 15 minutes prior to appointment.            Feb 08, 2017  1:30 PM CST   Office Visit with Og Haas PA-C   [facility] GASTROENTEROLOG[facility] (--)    [address] [place] [address] [address]  [facility] [address] [place] [id number] [phone]           Arrive 15 minutes prior to appointment.            Apr 17, 2017  1:15 PM CDT   Office Visit with Og Nunes MD   [facility] (--)    [address] [place] [address]  [facility] [address] [phone]           Arrive 15 minutes prior to appointment.              Allergies     Bactrim [Sulfamethoxazole-trimethoprim]      Methotrexate Derivatives      Morphine And Related  Hives    Singulair [Montelukast Sodium]      Sulfa Antibiotics  Irritability    Tetanus Toxoids  Other (See Comments)    *tetanus vaccines  *fainted      Reason for Visit     Hypertension     Allergic Rhinitis     Fall           Vital Signs     Blood Pressure Pulse Temperature Respirations Height Weight    138/68 77 98.5 °F (36.9 °C) 18 63\" (160 cm) 188 lb 4 oz (85.4 kg)    Oxygen Saturation Body Mass Index Smoking Status             96% 33.35 kg/m2 Never Smoker           "

## 2017-02-02 ENCOUNTER — TELEPHONE (OUTPATIENT)
Dept: FAMILY MEDICINE CLINIC | Facility: CLINIC | Age: 62
End: 2017-02-02

## 2017-02-02 RX ORDER — SODIUM, POTASSIUM,MAG SULFATES 17.5-3.13G
SOLUTION, RECONSTITUTED, ORAL ORAL
COMMUNITY
Start: 2017-01-04

## 2017-02-02 NOTE — TELEPHONE ENCOUNTER
Pt called and stated that they originally thought the tylenol 3 was a 30 day but in fact it was for 15 days . Pt states she would like the additional medication called in to last her until her next visit. Pt states that if she needs to come in to be seen that she would gladly come in.

## 2017-02-06 ENCOUNTER — TELEPHONE (OUTPATIENT)
Dept: FAMILY MEDICINE CLINIC | Facility: CLINIC | Age: 62
End: 2017-02-06

## 2017-02-13 RX ORDER — FLUTICASONE PROPIONATE 50 MCG
2 SPRAY, SUSPENSION (ML) NASAL DAILY
Qty: 3 EACH | Refills: 1 | Status: SHIPPED | OUTPATIENT
Start: 2017-02-13 | End: 2017-02-20 | Stop reason: SDUPTHER

## 2017-02-13 RX ORDER — TRAZODONE HYDROCHLORIDE 50 MG/1
50 TABLET ORAL NIGHTLY
Qty: 90 TABLET | Refills: 1 | Status: SHIPPED | OUTPATIENT
Start: 2017-02-13 | End: 2017-02-20 | Stop reason: SDUPTHER

## 2017-02-20 RX ORDER — FLUTICASONE PROPIONATE 50 MCG
2 SPRAY, SUSPENSION (ML) NASAL DAILY
Qty: 6 EACH | Refills: 0 | Status: SHIPPED | OUTPATIENT
Start: 2017-02-20 | End: 2017-05-21

## 2017-02-20 RX ORDER — TRAZODONE HYDROCHLORIDE 50 MG/1
50 TABLET ORAL NIGHTLY
Qty: 90 TABLET | Refills: 1 | Status: SHIPPED | OUTPATIENT
Start: 2017-02-20

## 2017-04-24 RX ORDER — CHOLECALCIFEROL (VITAMIN D3) 125 MCG
1 TABLET ORAL DAILY
Qty: 90 TABLET | Refills: 3 | Status: SHIPPED | OUTPATIENT
Start: 2017-04-24

## 2017-04-24 RX ORDER — CYCLOBENZAPRINE HCL 5 MG
5 TABLET ORAL 3 TIMES DAILY PRN
Qty: 270 TABLET | Refills: 1 | Status: SHIPPED | OUTPATIENT
Start: 2017-04-24

## 2017-04-24 RX ORDER — FEXOFENADINE HCL 180 MG/1
180 TABLET ORAL DAILY
Qty: 90 TABLET | Refills: 3 | Status: SHIPPED | OUTPATIENT
Start: 2017-04-24

## 2017-07-26 RX ORDER — FLUTICASONE PROPIONATE 50 MCG
SPRAY, SUSPENSION (ML) NASAL
Qty: 48 G | Refills: 0 | Status: SHIPPED | OUTPATIENT
Start: 2017-07-26 | End: 2018-01-01 | Stop reason: SDUPTHER

## 2017-07-26 RX ORDER — TRAZODONE HYDROCHLORIDE 50 MG/1
TABLET ORAL
Qty: 90 TABLET | Refills: 0 | Status: SHIPPED | OUTPATIENT
Start: 2017-07-26

## 2017-08-04 RX ORDER — AMLODIPINE BESYLATE 5 MG/1
TABLET ORAL
Qty: 90 TABLET | Refills: 5 | Status: SHIPPED | OUTPATIENT
Start: 2017-08-04

## 2018-01-02 RX ORDER — FLUTICASONE PROPIONATE 50 MCG
SPRAY, SUSPENSION (ML) NASAL
Qty: 48 G | Refills: 0 | Status: SHIPPED | OUTPATIENT
Start: 2018-01-02 | End: 2018-03-15 | Stop reason: SDUPTHER

## 2018-03-15 RX ORDER — FLUTICASONE PROPIONATE 50 MCG
SPRAY, SUSPENSION (ML) NASAL
Qty: 48 G | Refills: 0 | Status: SHIPPED | OUTPATIENT
Start: 2018-03-15

## 2018-04-11 ENCOUNTER — CONVERSION ENCOUNTER (OUTPATIENT)
Dept: OTHER | Facility: HOSPITAL | Age: 63
End: 2018-04-11

## 2018-09-12 ENCOUNTER — OFFICE VISIT CONVERTED (OUTPATIENT)
Dept: OTHER | Facility: HOSPITAL | Age: 63
End: 2018-09-12
Attending: NURSE PRACTITIONER

## 2019-01-30 ENCOUNTER — CONVERSION ENCOUNTER (OUTPATIENT)
Dept: OTHER | Facility: HOSPITAL | Age: 64
End: 2019-01-30

## 2019-01-30 ENCOUNTER — OFFICE VISIT CONVERTED (OUTPATIENT)
Dept: OTHER | Facility: HOSPITAL | Age: 64
End: 2019-01-30
Attending: NURSE PRACTITIONER

## 2019-06-19 ENCOUNTER — HOSPITAL ENCOUNTER (OUTPATIENT)
Dept: OTHER | Facility: HOSPITAL | Age: 64
Discharge: HOME | End: 2019-06-19

## 2019-06-19 ENCOUNTER — CONVERSION ENCOUNTER (OUTPATIENT)
Dept: OTHER | Facility: HOSPITAL | Age: 64
End: 2019-06-19

## 2019-06-19 ENCOUNTER — OFFICE VISIT CONVERTED (OUTPATIENT)
Dept: OTHER | Facility: HOSPITAL | Age: 64
End: 2019-06-19
Attending: NURSE PRACTITIONER

## 2019-06-19 LAB
ALBUMIN SERPL-MCNC: 4.2 G/DL (ref 3.5–5)
ALBUMIN/GLOB SERPL: 1.3 {RATIO} (ref 1.4–2.6)
ALP SERPL-CCNC: 124 U/L (ref 43–160)
ALT SERPL-CCNC: 24 U/L (ref 10–40)
ANION GAP SERPL CALC-SCNC: 18 MMOL/L (ref 8–19)
AST SERPL-CCNC: 25 U/L (ref 15–50)
BASOPHILS # BLD AUTO: 0.04 10*3/UL (ref 0–0.2)
BASOPHILS NFR BLD AUTO: 0.5 % (ref 0–3)
BILIRUB SERPL-MCNC: 0.23 MG/DL (ref 0.2–1.3)
BUN SERPL-MCNC: 7 MG/DL (ref 5–25)
BUN/CREAT SERPL: 10 {RATIO} (ref 6–20)
CALCIUM SERPL-MCNC: 9.3 MG/DL (ref 8.7–10.4)
CHLORIDE SERPL-SCNC: 102 MMOL/L (ref 99–111)
CHOLEST SERPL-MCNC: 239 MG/DL (ref 107–200)
CHOLEST/HDLC SERPL: 4.1 {RATIO} (ref 3–6)
CK SERPL-CCNC: 176 U/L (ref 35–230)
CONV ABS IMM GRAN: 0.01 10*3/UL (ref 0–0.2)
CONV CO2: 24 MMOL/L (ref 22–32)
CONV IMMATURE GRAN: 0.1 % (ref 0–1.8)
CONV TOTAL PROTEIN: 7.4 G/DL (ref 6.3–8.2)
CREAT UR-MCNC: 0.68 MG/DL (ref 0.5–0.9)
DEPRECATED RDW RBC AUTO: 44.4 FL (ref 36.4–46.3)
EOSINOPHIL # BLD AUTO: 0.07 10*3/UL (ref 0–0.7)
EOSINOPHIL # BLD AUTO: 0.9 % (ref 0–7)
ERYTHROCYTE [DISTWIDTH] IN BLOOD BY AUTOMATED COUNT: 14.4 % (ref 11.7–14.4)
GFR SERPLBLD BASED ON 1.73 SQ M-ARVRAT: >60 ML/MIN/{1.73_M2}
GLOBULIN UR ELPH-MCNC: 3.2 G/DL (ref 2–3.5)
GLUCOSE SERPL-MCNC: 94 MG/DL (ref 65–99)
HBA1C MFR BLD: 13.4 G/DL (ref 12–16)
HCT VFR BLD AUTO: 42.4 % (ref 37–47)
HDLC SERPL-MCNC: 58 MG/DL (ref 40–60)
LDLC SERPL CALC-MCNC: 153 MG/DL (ref 70–100)
LYMPHOCYTES # BLD AUTO: 2.62 10*3/UL (ref 1–5)
MCH RBC QN AUTO: 26.9 PG (ref 27–31)
MCHC RBC AUTO-ENTMCNC: 31.6 G/DL (ref 33–37)
MCV RBC AUTO: 85.1 FL (ref 81–99)
MONOCYTES # BLD AUTO: 0.31 10*3/UL (ref 0.2–1.2)
MONOCYTES NFR BLD AUTO: 4.1 % (ref 3–10)
NEUTROPHILS # BLD AUTO: 4.45 10*3/UL (ref 2–8)
NEUTROPHILS NFR BLD AUTO: 59.5 % (ref 30–85)
NRBC CBCN: 0 % (ref 0–0.7)
OSMOLALITY SERPL CALC.SUM OF ELEC: 288 MOSM/KG (ref 273–304)
PLATELET # BLD AUTO: 263 10*3/UL (ref 130–400)
PMV BLD AUTO: 10.9 FL (ref 9.4–12.3)
POTASSIUM SERPL-SCNC: 4.1 MMOL/L (ref 3.5–5.3)
RBC # BLD AUTO: 4.98 10*6/UL (ref 4.2–5.4)
SODIUM SERPL-SCNC: 140 MMOL/L (ref 135–147)
TRIGL SERPL-MCNC: 140 MG/DL (ref 40–150)
TSH SERPL-ACNC: 1.12 M[IU]/L (ref 0.27–4.2)
VARIANT LYMPHS NFR BLD MANUAL: 34.9 % (ref 20–45)
VLDLC SERPL-MCNC: 28 MG/DL (ref 5–37)
WBC # BLD AUTO: 7.5 10*3/UL (ref 4.8–10.8)

## 2019-06-20 LAB — 25(OH)D3 SERPL-MCNC: 25 NG/ML (ref 30–100)

## 2019-09-25 ENCOUNTER — OFFICE VISIT CONVERTED (OUTPATIENT)
Dept: OTHER | Facility: HOSPITAL | Age: 64
End: 2019-09-25
Attending: NURSE PRACTITIONER

## 2019-09-25 ENCOUNTER — CONVERSION ENCOUNTER (OUTPATIENT)
Dept: OTHER | Facility: HOSPITAL | Age: 64
End: 2019-09-25

## 2019-12-23 ENCOUNTER — OFFICE VISIT CONVERTED (OUTPATIENT)
Dept: OTHER | Facility: HOSPITAL | Age: 64
End: 2019-12-23
Attending: NURSE PRACTITIONER

## 2019-12-23 ENCOUNTER — CONVERSION ENCOUNTER (OUTPATIENT)
Dept: OTHER | Facility: HOSPITAL | Age: 64
End: 2019-12-23

## 2019-12-23 ENCOUNTER — HOSPITAL ENCOUNTER (OUTPATIENT)
Dept: OTHER | Facility: HOSPITAL | Age: 64
Discharge: HOME | End: 2019-12-23

## 2019-12-23 LAB
25(OH)D3 SERPL-MCNC: 30.3 NG/ML (ref 30–100)
ALBUMIN SERPL-MCNC: 4 G/DL (ref 3.5–5)
ALBUMIN/GLOB SERPL: 1.3 {RATIO} (ref 1.4–2.6)
ALP SERPL-CCNC: 103 U/L (ref 43–160)
ALT SERPL-CCNC: 22 U/L (ref 10–40)
ANION GAP SERPL CALC-SCNC: 20 MMOL/L (ref 8–19)
AST SERPL-CCNC: 25 U/L (ref 15–50)
BASOPHILS # BLD AUTO: 0.04 10*3/UL (ref 0–0.2)
BASOPHILS NFR BLD AUTO: 0.6 % (ref 0–3)
BILIRUB SERPL-MCNC: 0.26 MG/DL (ref 0.2–1.3)
BUN SERPL-MCNC: 5 MG/DL (ref 5–25)
BUN/CREAT SERPL: 7 {RATIO} (ref 6–20)
CALCIUM SERPL-MCNC: 9.5 MG/DL (ref 8.7–10.4)
CHLORIDE SERPL-SCNC: 102 MMOL/L (ref 99–111)
CHOLEST SERPL-MCNC: 247 MG/DL (ref 107–200)
CHOLEST/HDLC SERPL: 4.1 {RATIO} (ref 3–6)
CONV ABS IMM GRAN: 0.01 10*3/UL (ref 0–0.2)
CONV CO2: 22 MMOL/L (ref 22–32)
CONV IMMATURE GRAN: 0.2 % (ref 0–1.8)
CONV TOTAL PROTEIN: 7.2 G/DL (ref 6.3–8.2)
CREAT UR-MCNC: 0.71 MG/DL (ref 0.5–0.9)
DEPRECATED RDW RBC AUTO: 44 FL (ref 36.4–46.3)
EOSINOPHIL # BLD AUTO: 0.1 10*3/UL (ref 0–0.7)
EOSINOPHIL # BLD AUTO: 1.6 % (ref 0–7)
ERYTHROCYTE [DISTWIDTH] IN BLOOD BY AUTOMATED COUNT: 14.4 % (ref 11.7–14.4)
GFR SERPLBLD BASED ON 1.73 SQ M-ARVRAT: >60 ML/MIN/{1.73_M2}
GLOBULIN UR ELPH-MCNC: 3.2 G/DL (ref 2–3.5)
GLUCOSE SERPL-MCNC: 110 MG/DL (ref 65–99)
HCT VFR BLD AUTO: 44.2 % (ref 37–47)
HDLC SERPL-MCNC: 60 MG/DL (ref 40–60)
HGB BLD-MCNC: 13.8 G/DL (ref 12–16)
LDLC SERPL CALC-MCNC: 162 MG/DL (ref 70–100)
LYMPHOCYTES # BLD AUTO: 2.35 10*3/UL (ref 1–5)
LYMPHOCYTES NFR BLD AUTO: 36.7 % (ref 20–45)
MCH RBC QN AUTO: 26.4 PG (ref 27–31)
MCHC RBC AUTO-ENTMCNC: 31.2 G/DL (ref 33–37)
MCV RBC AUTO: 84.5 FL (ref 81–99)
MONOCYTES # BLD AUTO: 0.28 10*3/UL (ref 0.2–1.2)
MONOCYTES NFR BLD AUTO: 4.4 % (ref 3–10)
NEUTROPHILS # BLD AUTO: 3.63 10*3/UL (ref 2–8)
NEUTROPHILS NFR BLD AUTO: 56.5 % (ref 30–85)
NRBC CBCN: 0 % (ref 0–0.7)
OSMOLALITY SERPL CALC.SUM OF ELEC: 288 MOSM/KG (ref 273–304)
PLATELET # BLD AUTO: 266 10*3/UL (ref 130–400)
PMV BLD AUTO: 10.9 FL (ref 9.4–12.3)
POTASSIUM SERPL-SCNC: 4 MMOL/L (ref 3.5–5.3)
RBC # BLD AUTO: 5.23 10*6/UL (ref 4.2–5.4)
SODIUM SERPL-SCNC: 140 MMOL/L (ref 135–147)
TRIGL SERPL-MCNC: 123 MG/DL (ref 40–150)
TSH SERPL-ACNC: 1.2 M[IU]/L (ref 0.27–4.2)
VLDLC SERPL-MCNC: 25 MG/DL (ref 5–37)
WBC # BLD AUTO: 6.41 10*3/UL (ref 4.8–10.8)

## 2019-12-30 ENCOUNTER — HOSPITAL ENCOUNTER (OUTPATIENT)
Dept: OTHER | Facility: HOSPITAL | Age: 64
Discharge: HOME | End: 2019-12-30

## 2019-12-30 LAB
EST. AVERAGE GLUCOSE BLD GHB EST-MCNC: 111 MG/DL
HBA1C MFR BLD: 5.5 % (ref 3.5–5.7)

## 2020-02-17 ENCOUNTER — OFFICE VISIT CONVERTED (OUTPATIENT)
Dept: FAMILY MEDICINE CLINIC | Facility: CLINIC | Age: 65
End: 2020-02-17
Attending: NURSE PRACTITIONER

## 2020-04-28 ENCOUNTER — TELEMEDICINE CONVERTED (OUTPATIENT)
Dept: FAMILY MEDICINE CLINIC | Facility: CLINIC | Age: 65
End: 2020-04-28
Attending: NURSE PRACTITIONER

## 2020-05-18 ENCOUNTER — OFFICE VISIT CONVERTED (OUTPATIENT)
Dept: FAMILY MEDICINE CLINIC | Facility: CLINIC | Age: 65
End: 2020-05-18
Attending: NURSE PRACTITIONER

## 2021-05-12 NOTE — PROGRESS NOTES
Progress Note      Patient Name: Mylene Cortez   Patient ID: [id number]   Sex: Female   YOB: 1955    Primary Care Provider: Jovana OQUENDO    Visit Date: April 28, 2020    Provider: JERE Gomes   Location: [facility]   Location Address: [address] [place] [address], [address] [place] [address]  [id number]   Location Phone: [phone]          Chief Complaint  · f/u pneumonia  · patient was in [facility] x 13 days with pneumonia and needs a followup chest x ray. She completed Levofloxacin and has home health.      History Of Present Illness  Video Conferencing Visit  Mylene Cortez is a 64 year old /White female who is presenting for evaluation via video conferencing. Verbal consent obtained before beginning visit.   The following staff were present during this visit: Damien ROOT   Mylene Cortez is a 64 year old /White female who presents for evaluation and treatment of: pt had been in [facility] 3-1-20-3-13-20 for initially low oxygenation and then was told had pneumonia. Physical therapy dismissed her today and home health has been monitoring her b/p which has been good. she also has HTN, GERD       Past Medical History  Disease Name Date Onset Notes   Allergic rhinitis --  --    Anemia --  --    Arthritis --  --    Asthma --  --    Colitis --  --    Diaphragmatic hernia --  --    Diverticulitis --  --    Gall stones --  --    Head injury --  --    Hemorrhoids --  --    High blood cholesterol --  --    High blood pressure --  --    Insomnia --  --    Migraine headache --  --    Night sweat --  --    Polymyositis --  --    Psoriasis --  --    Reflux Disease --  --    Screening for breast cancer 5/20/2019 [facility]   Screening for colon cancer 2/26/2019 --    Sinus trouble --  --    Vitamin D Deficiency --  --          Past Surgical History  Procedure Name Date Notes   Gallbladder 2008 --    Hysterectomy 2004 --    Knee surgery 2007 --          Medication  List  Name Date Started Instructions   aspirin 81 mg oral tablet,delayed release (DR/EC)  take 1 tablet (81 mg) by oral route once daily   cyclobenzaprine 5 mg oral tablet 09/25/2019 TAKE 1 TABLET DAILY for 90 days   ergocalciferol (vitamin D2) 50,000 unit oral capsule 02/17/2020 TAKE 1 CAPSULE WEEKLY   Flonase Allergy Relief 50 mcg/actuation nasal spray,suspension 06/19/2019 inhale 1 puff by nasal route daily as needed for 90 days   melatonin 10 mg oral capsule 02/17/2020 take 1 capsule by oral route As needed for 90 days   montelukast 10 mg oral tablet 02/17/2020 take 1 tablet (10 mg) by oral route once daily in the evening for 90 days   Norvasc 5 mg oral tablet 02/17/2020 take 1 tablet (5 mg) by oral route once daily for 90 days   pantoprazole 40 mg oral tablet,delayed release (DR/EC) 02/17/2020 take 1 tablet (40 mg) by oral route once daily for 90 days         Allergy List  Allergen Name Date Reaction Notes   Bactrim --  --  --    methotrexate --  --  --    morphine --  --  --    Singulair --  --  --    STATINS-HMG-COA REDUCTASE INHIBITORS --  Intolerant --    SULFA (SULFONAMIDES) --  --  --    TETANUS --  --  --          Family Medical History  Disease Name Relative/Age Notes   Family history of colon cancer  --    Family history of breast cancer  sibling   Family history of rheumatoid arthritis  sibling   Family history of stroke  --    Family history of Asthma  sibling         Social History  Finding Status Start/Stop Quantity Notes   Alcohol Never --/-- --  --     --  --/-- --  --    Recreational Drug Use Never --/-- --  --    Tobacco Never --/-- --  --          Immunizations  NameDate Admin Mfg Trade Name Lot Number Route Inj VIS Given VIS Publication   InfluenzaDeferred 12/23/2019 NE Not Entered  NE NE     Comments:    InfluenzaDeferred 06/19/2019 NE Not Entered  NE NE     Comments:    [id number] 06/19/2019 NE Not Entered  NE NE     Comments:    Prevnar 13Deferred 06/19/2019 NE Not Entered   NE NE     Comments:          Review of Systems  · Constitutional  o Admits  o : is feeling really good now  o Denies  o : fatigue, night sweats  · Eyes  o Denies  o : double vision, blurred vision  · HENT  o Denies  o : vertigo, recent head injury, sore throat, headache, pnd  · Breasts  o Denies  o : abnormal changes in breast size, additional breast symptoms except as noted in the HPI  · Cardiovascular  o Denies  o : chest pain, irregular heart beats, no swelling in feet or legs  · Respiratory  o Admits  o : has a cough at night when she first lays down and thinks its d/t allergies. it does not keep her awake and goes away. she had finished her levofloxacin  o Denies  o : shortness of breath, productive cough  · Gastrointestinal  o Admits  o : appetite is good, no reflux  o Denies  o : nausea, vomiting, constipation, diarrhea  · Genitourinary  o Denies  o : dysuria, urinary retention  · Integument  o Denies  o : hair growth change, new skin lesions  · Neurologic  o Denies  o : altered mental status, seizures  · Musculoskeletal  o Admits  o : has returned to her usual active of daily living  o Denies  o : joint swelling, limitation of motion  · Endocrine  o Denies  o : cold intolerance, heat intolerance  · Psychiatric  o Denies  o : anxiety, depression  · Heme-Lymph  o Denies  o : petechiae, lymph node enlargement or tenderness  · Allergic-Immunologic  o Denies  o : frequent illnesses      Physical Examination  · Constitutional  o Appearance  o : well-nourished, well developed, alert, in no acute distress  · Head and Face  o Face  o :   § Inspection  § : no pallor  · Eyes  o Conjunctivae  o : conjunctivae normal  o Sclerae  o : sclerae white  o Eyelids/Ocular Adnexae  o : eyelid appearance normal, no exudates present, eye moisture level normal  · Respiratory  o Respiratory Effort  o : breathing unlabored  · Neurologic  o Mental Status Examination  o : judgement, insight intact, modd and affect  appropriate          Assessment  · Allergic rhinitis due to allergen     477.9/J30.9  · Essential hypertension     401.9/I10  · GERD (gastroesophageal reflux disease)     530.81/K21.9  · Pneumonia     486/J18.9      Plan  · Orders  o ACO-39: Current medications updated and reviewed () - - 04/28/2020  o Chest xray 2 views [facility] Preferred View ([id number]) - 486/J18.9 - 04/28/2020   pt had pneumonia in march and was to have repeat chest xray- march 1-13,2020  · Medications  o Medications have been Reconciled  o Transition of Care or Provider Policy  · Instructions  o Patient advised to monitor blood pressure (B/P) at home and journal readings. Patient informed that a B/P reading at home of more than 130/80 is considered hypertension. For readings greater xnsq288/90 or higher patient is advised to follow up in the office with readings for management. Patient advised to limit sodium intake.  o Take all medications as prescribed/directed.  o Patient was educated/instructed on their diagnosis, treatment and medications prior to discharge from the clinic today.  o she's not sure but her discharge paper said she was to have a repeat chest CT in month. she is printing her records  · Disposition  o Follow up in 1 month            Electronically Signed by: JERE Gomes -Author on April 28, 2020 03:02:45 PM

## 2021-05-13 NOTE — PROGRESS NOTES
Progress Note      Patient Name: Mylene Cortez   Patient ID: [id number]   Sex: Female   YOB: 1955    Primary Care Provider: Jovana OQUENDO    Visit Date: May 18, 2020    Provider: JERE Gomes   Location: [facility]   Location Address: [address] [place] [address], [address] [place] [address]  [id number]   Location Phone: [phone]          Chief Complaint  · f/u  · (B) hip pain after sitting for a while  · refill all meds      History Of Present Illness  Mylene Cortez is a 64 year old /White female who presents for evaluation and treatment of: pt doing fairly well since pneumonia in March. she is having some pain in her hips when she sits too long and after she moves it feels better.       Past Medical History  Disease Name Date Onset Notes   Allergic rhinitis --  --    Anemia --  --    Arthritis --  --    Asthma --  --    Colitis --  --    Diaphragmatic hernia --  --    Diverticulitis --  --    Gall stones --  --    Head injury --  --    Hemorrhoids --  --    High blood cholesterol --  --    High blood pressure --  --    Insomnia --  --    Migraine headache --  --    Night sweat --  --    Polymyositis --  --    Psoriasis --  --    Reflux Disease --  --    Screening for breast cancer 5/20/2019 [facility]   Screening for colon cancer 2/26/2019 --    Sinus trouble --  --    Vitamin D Deficiency --  --          Past Surgical History  Procedure Name Date Notes   Gallbladder 2008 --    Hysterectomy 2004 --    Knee surgery 2007 --          Medication List  Name Date Started Instructions   aspirin 81 mg oral tablet,delayed release (DR/EC)  take 1 tablet (81 mg) by oral route once daily   cyclobenzaprine 5 mg oral tablet 09/25/2019 TAKE 1 TABLET DAILY for 90 days   ergocalciferol (vitamin D2) 50,000 unit oral capsule 02/17/2020 TAKE 1 CAPSULE WEEKLY   Flonase Allergy Relief 50 mcg/actuation nasal spray,suspension 06/19/2019 inhale 1 puff by nasal route daily as needed for 90 days    melatonin 10 mg oral capsule 02/17/2020 take 1 capsule by oral route As needed for 90 days   montelukast 10 mg oral tablet 02/17/2020 take 1 tablet (10 mg) by oral route once daily in the evening for 90 days   Norvasc 5 mg oral tablet 02/17/2020 take 1 tablet (5 mg) by oral route once daily for 90 days   pantoprazole 40 mg oral tablet,delayed release (DR/EC) 02/17/2020 take 1 tablet (40 mg) by oral route once daily for 90 days         Allergy List  Allergen Name Date Reaction Notes   Bactrim --  --  --    methotrexate --  --  --    morphine --  --  --    Singulair --  --  --    STATINS-HMG-COA REDUCTASE INHIBITORS --  Intolerant --    SULFA (SULFONAMIDES) --  --  --    TETANUS --  --  --          Family Medical History  Disease Name Relative/Age Notes   Family history of colon cancer  --    Family history of breast cancer  sibling   Family history of rheumatoid arthritis  sibling   Family history of stroke  --    Family history of Asthma  sibling         Social History  Finding Status Start/Stop Quantity Notes   Alcohol Never --/-- --  --     --  --/-- --  --    Recreational Drug Use Never --/-- --  --    Tobacco Never --/-- --  --          Immunizations  NameDate Admin Mfg Trade Name Lot Number Route Inj VIS Given VIS Publication   InfluenzaDeferred 12/23/2019 NE Not Entered  NE NE     Comments:    InfluenzaDeferred 06/19/2019 NE Not Entered  NE NE     Comments:    [id number] 06/19/2019 NE Not Entered  NE NE     Comments:    Prevnar 13Deferred 06/19/2019 NE Not Entered  NE NE     Comments:          Review of Systems  · Constitutional  o Admits  o : she is just finished physical therapy as they have been working with her for strengthening  o Denies  o : fatigue, night sweats  · Eyes  o Denies  o : double vision, blurred vision  · HENT  o Denies  o : vertigo, recent head injury  · Breasts  o Denies  o : abnormal changes in breast size, additional breast symptoms except as noted in the  "HPI  · Cardiovascular  o Denies  o : chest pain, irregular heart beats  · Respiratory  o Denies  o : shortness of breath, productive cough  · Gastrointestinal  o Admits  o : . eating and drinking just fine  o Denies  o : nausea, vomiting, constipation, diarrhea  · Genitourinary  o Denies  o : dysuria, urinary retention  · Integument  o Denies  o : hair growth change, new skin lesions  · Neurologic  o Denies  o : altered mental status, seizures  · Musculoskeletal  o Admits  o : bilat hip pain when she sits too long.she wears AFO on L lower leg. She takes the Tylenol and this seems to take away the pain. She has taken Tylenol and a Flexeril and it does help  o Denies  o : joint swelling, limitation of motion  · Endocrine  o Denies  o : cold intolerance, heat intolerance  · Psychiatric  o Denies  o : anxiety, depression, difficulty sleeping  · Heme-Lymph  o Denies  o : petechiae, lymph node enlargement or tenderness  · Allergic-Immunologic  o Denies  o : frequent illnesses      Vitals  Date Time BP Position Site L\R Cuff Size HR RR TEMP (F) WT  HT  BMI kg/m2 BSA m2 O2 Sat        05/18/2020 01:15 /76 Sitting    79 - R 16 97.1 175lbs 2oz 5'  3\" 31.02 1.88 98 %          Physical Examination  · Constitutional  o Appearance  o : well-nourished, well developed, alert, in no acute distress  · Head and Face  o Face  o :   § Inspection  § : no facial lesions  § Palpation  § : no sinus tenderness on palpation  · Eyes  o Conjunctivae  o : conjunctivae normal  o Sclerae  o : sclerae white  o Pupils and Irises  o : pupils equal, round, and reactive to light and accommodation bilaterally  o Corneas  o : tear film normal, no lesions present  o Eyelids/Ocular Adnexae  o : eyelid appearance normal, no exudates present, eye moisture level normal  · Ears, Nose, Mouth and Throat  o Ears  o : external ear auricle normal, otic canal normal, TM with no reddness, effusion, retraction  o Nose  o : external normal, nasal mucosa normal, " turbinates normal  o Oral Cavity  o : tongue no lesion, oral mucosa normal  o Throat  o : no erythemia, exudate or lesions  · Neck  o Inspection/Palpation  o : normal appearance, no masses or tenderness, trachea midline, no enlarged cervical or supraclavicular lymphnodes palpated  o Thyroid  o : gland size normal, nontender, no nodules or masses present on palpation, thyroid motion normal during swallowing  · Respiratory  o Respiratory Effort  o : breathing unlabored  o Auscultation of Lungs  o : normal breath sounds throughout  · Cardiovascular  o Heart  o :   § Auscultation of Heart  § : regular rate and rhythm without murmur  o Peripheral Vascular System  o :   § Carotid Arteries  § : normal pulses bilaterally, no bruits present  § Extremities  § : no edema, no cyanosis, no distal hair loss, normal capillary refill  · Gastrointestinal  o Abdominal Examination  o : abdomen nontender to palpation, normal bowel sounds, tone normal without rigidity or guarding, no masses present, abdomen scaphoid upon supine  · Musculoskeletal  o General  o :   § General Musculoskeletal  § : No joint swelling or deformity., Muscle tone, strength, and development grossly normal. No palpable pain in hips  o Right Upper Extremity  o :   § Range of Motion  § : range of motion normal  o Left Upper Extremity  o :   § Range of Motion  § : range of motion normal  · Skin and Subcutaneous Tissue  o General Inspection  o : no rashes or lesions present, no areas of discoloration  · Neurologic  o Mental Status Examination  o : judgement, insight intact, modd and affect appropriate  o Motor Examination  o : strength grossly intact in all four extremities  o Gait and Station  o : normal gait, able to stand without difficulty          Assessment  · Visit for screening mammogram     V76.12/Z12.31  · Allergic rhinitis due to allergen     477.9/J30.9  · Pneumonia     486/J18.9  · Hip pain     719.45/M25.559  · Post menopausal  syndrome     627.9/N95.1  · Arthritis     716.90/M19.90      Plan  · Orders  o ACO-39: Current medications updated and reviewed () - - 05/18/2020  o CT Chest with IV Contrast [facility] ([id number]) - 486/J18.9 - 05/18/2020   hospitalized for pneumonia March. this CT to check for resolution  o Mammogram breast screening 3D digital bilateral (68380, 32793, ) - V76.12/Z12.31 - 05/18/2020  o DEXA Bone Density, 1 or more sites, axial skeleton [facility] ([id number]) - 627.9/N95.1 - 05/18/2020  · Medications  o cyclobenzaprine 5 mg oral tablet   SIG: TAKE 1 TABLET DAILY for 90 days   DISP: (90) Tablet with 1 refills  Adjusted on 05/18/2020     o Medications have been Reconciled  o Transition of Care or Provider Policy  · Instructions  o Take all medications as prescribed/directed.  o Patient was educated/instructed on their diagnosis, treatment and medications prior to discharge from the clinic today.  o Continue to within her limits  o . discussed doing x-rays of her hip but she wants to wait  o . instructed to follow-up if her pain gets worse  · Disposition  o Follow up in 6 months            Electronically Signed by: JERE Gomes -Author on May 18, 2020 01:54:02 PM

## 2021-05-15 VITALS
TEMPERATURE: 97.6 F | SYSTOLIC BLOOD PRESSURE: 138 MMHG | WEIGHT: 188 LBS | DIASTOLIC BLOOD PRESSURE: 80 MMHG | BODY MASS INDEX: 33.31 KG/M2 | RESPIRATION RATE: 18 BRPM | HEIGHT: 63 IN | OXYGEN SATURATION: 96 % | HEART RATE: 85 BPM

## 2021-05-15 VITALS
DIASTOLIC BLOOD PRESSURE: 74 MMHG | HEART RATE: 87 BPM | TEMPERATURE: 98 F | RESPIRATION RATE: 18 BRPM | SYSTOLIC BLOOD PRESSURE: 138 MMHG | WEIGHT: 181 LBS | OXYGEN SATURATION: 95 % | BODY MASS INDEX: 32.07 KG/M2 | HEIGHT: 63 IN

## 2021-05-15 VITALS
WEIGHT: 175.12 LBS | HEART RATE: 79 BPM | OXYGEN SATURATION: 98 % | TEMPERATURE: 97.1 F | SYSTOLIC BLOOD PRESSURE: 141 MMHG | BODY MASS INDEX: 31.03 KG/M2 | HEIGHT: 63 IN | DIASTOLIC BLOOD PRESSURE: 76 MMHG | RESPIRATION RATE: 16 BRPM

## 2021-05-15 VITALS
SYSTOLIC BLOOD PRESSURE: 152 MMHG | HEART RATE: 79 BPM | OXYGEN SATURATION: 97 % | WEIGHT: 180.37 LBS | DIASTOLIC BLOOD PRESSURE: 72 MMHG | RESPIRATION RATE: 19 BRPM | HEIGHT: 63 IN | TEMPERATURE: 98 F | BODY MASS INDEX: 31.96 KG/M2

## 2021-05-15 VITALS
TEMPERATURE: 97.6 F | OXYGEN SATURATION: 95 % | DIASTOLIC BLOOD PRESSURE: 79 MMHG | WEIGHT: 187 LBS | SYSTOLIC BLOOD PRESSURE: 134 MMHG | HEIGHT: 63 IN | HEART RATE: 85 BPM | BODY MASS INDEX: 33.13 KG/M2 | RESPIRATION RATE: 18 BRPM

## 2021-05-15 VITALS
TEMPERATURE: 97.6 F | WEIGHT: 186 LBS | SYSTOLIC BLOOD PRESSURE: 138 MMHG | DIASTOLIC BLOOD PRESSURE: 60 MMHG | BODY MASS INDEX: 32.96 KG/M2 | HEART RATE: 84 BPM | OXYGEN SATURATION: 96 % | RESPIRATION RATE: 18 BRPM | HEIGHT: 63 IN

## 2021-05-16 VITALS
WEIGHT: 201 LBS | DIASTOLIC BLOOD PRESSURE: 80 MMHG | TEMPERATURE: 97.5 F | RESPIRATION RATE: 18 BRPM | BODY MASS INDEX: 35.61 KG/M2 | SYSTOLIC BLOOD PRESSURE: 144 MMHG | HEIGHT: 63 IN | HEART RATE: 83 BPM | OXYGEN SATURATION: 96 %

## 2021-05-16 VITALS
WEIGHT: 193 LBS | HEIGHT: 63 IN | RESPIRATION RATE: 18 BRPM | TEMPERATURE: 97.8 F | SYSTOLIC BLOOD PRESSURE: 138 MMHG | BODY MASS INDEX: 34.2 KG/M2 | OXYGEN SATURATION: 91 % | DIASTOLIC BLOOD PRESSURE: 72 MMHG | HEART RATE: 94 BPM